# Patient Record
Sex: MALE | Race: WHITE | NOT HISPANIC OR LATINO | Employment: FULL TIME | URBAN - NONMETROPOLITAN AREA
[De-identification: names, ages, dates, MRNs, and addresses within clinical notes are randomized per-mention and may not be internally consistent; named-entity substitution may affect disease eponyms.]

---

## 2022-11-04 ENCOUNTER — OFFICE VISIT (OUTPATIENT)
Dept: FAMILY MEDICINE CLINIC | Facility: CLINIC | Age: 52
End: 2022-11-04

## 2022-11-04 VITALS
TEMPERATURE: 97.5 F | SYSTOLIC BLOOD PRESSURE: 145 MMHG | HEART RATE: 65 BPM | HEIGHT: 72 IN | DIASTOLIC BLOOD PRESSURE: 82 MMHG | RESPIRATION RATE: 18 BRPM | OXYGEN SATURATION: 97 % | WEIGHT: 291.3 LBS | BODY MASS INDEX: 39.45 KG/M2

## 2022-11-04 DIAGNOSIS — I10 PRIMARY HYPERTENSION: Primary | Chronic | ICD-10-CM

## 2022-11-04 DIAGNOSIS — E66.01 CLASS 2 SEVERE OBESITY WITH SERIOUS COMORBIDITY AND BODY MASS INDEX (BMI) OF 39.0 TO 39.9 IN ADULT, UNSPECIFIED OBESITY TYPE: Chronic | ICD-10-CM

## 2022-11-04 DIAGNOSIS — Z13.220 SCREENING, LIPID: ICD-10-CM

## 2022-11-04 PROCEDURE — 99204 OFFICE O/P NEW MOD 45 MIN: CPT

## 2022-11-04 RX ORDER — LOSARTAN POTASSIUM 100 MG/1
100 TABLET ORAL DAILY
Qty: 90 TABLET | Refills: 1 | Status: SHIPPED | OUTPATIENT
Start: 2022-11-04

## 2022-11-04 RX ORDER — LOSARTAN POTASSIUM 100 MG/1
TABLET ORAL
COMMUNITY
Start: 2022-10-06 | End: 2022-11-04 | Stop reason: SDUPTHER

## 2022-11-04 RX ORDER — AMLODIPINE BESYLATE 5 MG/1
TABLET ORAL
COMMUNITY
Start: 2022-10-05 | End: 2022-11-04 | Stop reason: SDUPTHER

## 2022-11-04 RX ORDER — AMLODIPINE BESYLATE 5 MG/1
5 TABLET ORAL DAILY
Qty: 90 TABLET | Refills: 1 | Status: SHIPPED | OUTPATIENT
Start: 2022-11-04

## 2022-11-04 NOTE — PROGRESS NOTES
"Chief Complaint  Annual Exam, Establish Care, and Labs Only    Subjective        Basim Austin presents to [facility]  History of Present Illness  He is here to be seen for establishing care. He is also wanting to have his annual wellness exam done.       Objective   Vital Signs:  /82   Pulse 65   Temp 97.5 °F (36.4 °C)   Resp 18   Ht 182.9 cm (72\")   Wt 132 kg (291 lb 4.8 oz)   SpO2 97%   BMI 39.51 kg/m²   Estimated body mass index is 39.51 kg/m² as calculated from the following:    Height as of this encounter: 182.9 cm (72\").    Weight as of this encounter: 132 kg (291 lb 4.8 oz).          Physical Exam  Constitutional:       Appearance: Normal appearance.   HENT:      Nose: Nose normal.      Mouth/Throat:      Mouth: Mucous membranes are moist.   Cardiovascular:      Rate and Rhythm: Normal rate and regular rhythm.      Pulses: Normal pulses.   Pulmonary:      Effort: Pulmonary effort is normal.      Breath sounds: Normal breath sounds.   Skin:     General: Skin is warm and dry.   Neurological:      General: No focal deficit present.      Mental Status: He is alert and oriented to person, place, and time.   Psychiatric:         Mood and Affect: Mood normal.         Behavior: Behavior normal.        Result Review :                Assessment and Plan   Diagnoses and all orders for this visit:    1. Primary hypertension (Primary)  -     amLODIPine (NORVASC) 5 MG tablet; Take 1 tablet by mouth Daily.  Dispense: 90 tablet; Refill: 1  -     losartan (COZAAR) 100 MG tablet; Take 1 tablet by mouth Daily.  Dispense: 90 tablet; Refill: 1  -     CBC w AUTO Differential; Future  -     Comprehensive metabolic panel; Future    2. Screening, lipid  -     Lipid panel; Future    3. Class 2 severe obesity with serious comorbidity and body mass index (BMI) of 39.0 to 39.9 in adult, unspecified obesity type (HCC)  Assessment & Plan:  Patient's (Body mass index is 39.51 kg/m².) " indicates that they are obese (BMI >30) with health conditions that include hypertension . Weight is unchanged. BMI is is above average; BMI management plan is completed. We discussed portion control and increasing exercise.              Follow Up   Return if symptoms worsen or fail to improve.  Patient was given instructions and counseling regarding his condition or for health maintenance advice. Please see specific information pulled into the AVS if appropriate.

## 2022-11-07 ENCOUNTER — LAB (OUTPATIENT)
Dept: LAB | Facility: HOSPITAL | Age: 52
End: 2022-11-07

## 2022-11-07 DIAGNOSIS — I10 PRIMARY HYPERTENSION: ICD-10-CM

## 2022-11-07 DIAGNOSIS — Z13.220 SCREENING, LIPID: ICD-10-CM

## 2022-11-07 LAB
ALBUMIN SERPL-MCNC: 4 G/DL (ref 3.5–5.2)
ALBUMIN/GLOB SERPL: 1.3 G/DL
ALP SERPL-CCNC: 80 U/L (ref 39–117)
ALT SERPL W P-5'-P-CCNC: 37 U/L (ref 1–41)
ANION GAP SERPL CALCULATED.3IONS-SCNC: 8.8 MMOL/L (ref 5–15)
AST SERPL-CCNC: 31 U/L (ref 1–40)
BASOPHILS # BLD AUTO: 0.05 10*3/MM3 (ref 0–0.2)
BASOPHILS NFR BLD AUTO: 0.9 % (ref 0–1.5)
BILIRUB SERPL-MCNC: 0.4 MG/DL (ref 0–1.2)
BUN SERPL-MCNC: 10 MG/DL (ref 6–20)
BUN/CREAT SERPL: 16.1 (ref 7–25)
CALCIUM SPEC-SCNC: 9.7 MG/DL (ref 8.6–10.5)
CHLORIDE SERPL-SCNC: 103 MMOL/L (ref 98–107)
CHOLEST SERPL-MCNC: 177 MG/DL (ref 0–200)
CO2 SERPL-SCNC: 26.2 MMOL/L (ref 22–29)
CREAT SERPL-MCNC: 0.62 MG/DL (ref 0.76–1.27)
DEPRECATED RDW RBC AUTO: 39 FL (ref 37–54)
EGFRCR SERPLBLD CKD-EPI 2021: 113.6 ML/MIN/1.73
EOSINOPHIL # BLD AUTO: 0.2 10*3/MM3 (ref 0–0.4)
EOSINOPHIL NFR BLD AUTO: 3.4 % (ref 0.3–6.2)
ERYTHROCYTE [DISTWIDTH] IN BLOOD BY AUTOMATED COUNT: 12.6 % (ref 12.3–15.4)
GLOBULIN UR ELPH-MCNC: 3.1 GM/DL
GLUCOSE SERPL-MCNC: 89 MG/DL (ref 65–99)
HCT VFR BLD AUTO: 45 % (ref 37.5–51)
HDLC SERPL-MCNC: 35 MG/DL (ref 40–60)
HGB BLD-MCNC: 15.3 G/DL (ref 13–17.7)
IMM GRANULOCYTES # BLD AUTO: 0.02 10*3/MM3 (ref 0–0.05)
IMM GRANULOCYTES NFR BLD AUTO: 0.3 % (ref 0–0.5)
LDLC SERPL CALC-MCNC: 118 MG/DL (ref 0–100)
LDLC/HDLC SERPL: 3.29 {RATIO}
LYMPHOCYTES # BLD AUTO: 1.88 10*3/MM3 (ref 0.7–3.1)
LYMPHOCYTES NFR BLD AUTO: 32.1 % (ref 19.6–45.3)
MCH RBC QN AUTO: 29.1 PG (ref 26.6–33)
MCHC RBC AUTO-ENTMCNC: 34 G/DL (ref 31.5–35.7)
MCV RBC AUTO: 85.7 FL (ref 79–97)
MONOCYTES # BLD AUTO: 0.53 10*3/MM3 (ref 0.1–0.9)
MONOCYTES NFR BLD AUTO: 9.1 % (ref 5–12)
NEUTROPHILS NFR BLD AUTO: 3.17 10*3/MM3 (ref 1.7–7)
NEUTROPHILS NFR BLD AUTO: 54.2 % (ref 42.7–76)
NRBC BLD AUTO-RTO: 0 /100 WBC (ref 0–0.2)
PLATELET # BLD AUTO: 225 10*3/MM3 (ref 140–450)
PMV BLD AUTO: 10.6 FL (ref 6–12)
POTASSIUM SERPL-SCNC: 4.4 MMOL/L (ref 3.5–5.2)
PROT SERPL-MCNC: 7.1 G/DL (ref 6–8.5)
RBC # BLD AUTO: 5.25 10*6/MM3 (ref 4.14–5.8)
SODIUM SERPL-SCNC: 138 MMOL/L (ref 136–145)
TRIGL SERPL-MCNC: 134 MG/DL (ref 0–150)
VLDLC SERPL-MCNC: 24 MG/DL (ref 5–40)
WBC NRBC COR # BLD: 5.85 10*3/MM3 (ref 3.4–10.8)

## 2022-11-07 PROCEDURE — 80053 COMPREHEN METABOLIC PANEL: CPT

## 2022-11-07 PROCEDURE — 36415 COLL VENOUS BLD VENIPUNCTURE: CPT

## 2022-11-07 PROCEDURE — 85025 COMPLETE CBC W/AUTO DIFF WBC: CPT

## 2022-11-07 PROCEDURE — 80061 LIPID PANEL: CPT

## 2022-11-09 PROBLEM — E66.01 CLASS 2 SEVERE OBESITY WITH SERIOUS COMORBIDITY AND BODY MASS INDEX (BMI) OF 39.0 TO 39.9 IN ADULT: Status: ACTIVE | Noted: 2022-11-09

## 2022-11-09 PROBLEM — E66.812 CLASS 2 SEVERE OBESITY WITH SERIOUS COMORBIDITY AND BODY MASS INDEX (BMI) OF 39.0 TO 39.9 IN ADULT: Status: ACTIVE | Noted: 2022-11-09

## 2022-11-09 PROBLEM — I10 PRIMARY HYPERTENSION: Status: ACTIVE | Noted: 2022-11-09

## 2022-11-09 NOTE — ASSESSMENT & PLAN NOTE
Patient's (Body mass index is 39.51 kg/m².) indicates that they are obese (BMI >30) with health conditions that include hypertension . Weight is unchanged. BMI is is above average; BMI management plan is completed. We discussed portion control and increasing exercise.

## 2023-04-17 ENCOUNTER — OFFICE VISIT (OUTPATIENT)
Dept: FAMILY MEDICINE CLINIC | Facility: CLINIC | Age: 53
End: 2023-04-17
Payer: COMMERCIAL

## 2023-04-17 ENCOUNTER — HOSPITAL ENCOUNTER (OUTPATIENT)
Dept: GENERAL RADIOLOGY | Facility: HOSPITAL | Age: 53
Discharge: HOME | End: 2023-04-17
Payer: COMMERCIAL

## 2023-04-17 ENCOUNTER — LAB (OUTPATIENT)
Dept: LAB | Facility: HOSPITAL | Age: 53
End: 2023-04-17
Payer: COMMERCIAL

## 2023-04-17 VITALS
SYSTOLIC BLOOD PRESSURE: 142 MMHG | BODY MASS INDEX: 40.17 KG/M2 | HEIGHT: 72 IN | OXYGEN SATURATION: 95 % | WEIGHT: 296.6 LBS | HEART RATE: 72 BPM | RESPIRATION RATE: 20 BRPM | DIASTOLIC BLOOD PRESSURE: 87 MMHG | TEMPERATURE: 98 F

## 2023-04-17 DIAGNOSIS — M25.562 ACUTE PAIN OF LEFT KNEE: ICD-10-CM

## 2023-04-17 DIAGNOSIS — M25.562 ACUTE PAIN OF LEFT KNEE: Primary | ICD-10-CM

## 2023-04-17 PROCEDURE — 36415 COLL VENOUS BLD VENIPUNCTURE: CPT

## 2023-04-17 PROCEDURE — 84550 ASSAY OF BLOOD/URIC ACID: CPT

## 2023-04-17 PROCEDURE — 73562 X-RAY EXAM OF KNEE 3: CPT

## 2023-04-17 NOTE — PROGRESS NOTES
Chief Complaint   Patient presents with   • Knee Pain     Left knee pain x2 weeks, no injury.       Subjective          Basim Austin presents to [facility]    History of Present Illness  He is here to be seen for knee pain in the left knee for 2 weeks. He has no injury that he knows of but it has been worsening. He has a past diagnosis of gout but wasn't sure that was what this was because it usually gets better and usually is in his toe or hands. Today we will take a uric acid level on him and an xray to determine next steps on treatment. He has hydrocodone at home he can take in the mean time.       Past History:  Medical History: has a past medical history of Arthritis () and Hypertension ().   Surgical History: has a past surgical history that includes Hernia repair () and Colonoscopy (,,).   Family History: family history includes Cancer in his mother; Heart disease in his father; Hyperlipidemia in his father.   Social History: reports that he quit smoking about 5 months ago. His smoking use included cigarettes. He started smoking about 11 years ago. He has a 2.50 pack-year smoking history. He has never been exposed to tobacco smoke. He has never used smokeless tobacco. He reports that he does not drink alcohol and does not use drugs.  Allergies: Patient has no known allergies.  (Not in a hospital admission)       Social History     Socioeconomic History   • Marital status:    Tobacco Use   • Smoking status: Former     Packs/day: 0.50     Years: 5.00     Pack years: 2.50     Types: Cigarettes     Start date: 2012     Quit date: 2022     Years since quittin.4     Passive exposure: Never   • Smokeless tobacco: Never   • Tobacco comments:     Stopped Smoking 2022   Vaping Use   • Vaping Use: Never used   Substance and Sexual Activity   • Alcohol use: Never   • Drug use: Never   • Sexual activity: Yes     Partners: Female  "      There are no preventive care reminders to display for this patient.    Objective     Vital Signs:   /87 (BP Location: Left arm, Patient Position: Sitting, Cuff Size: Adult)   Pulse 72   Temp 98 °F (36.7 °C) (Temporal)   Resp 20   Ht 182.9 cm (72\")   Wt 135 kg (296 lb 9.6 oz)   SpO2 95%   BMI 40.23 kg/m²       Physical Exam  Constitutional:       Appearance: Normal appearance.   HENT:      Nose: Nose normal.      Mouth/Throat:      Mouth: Mucous membranes are moist.   Cardiovascular:      Rate and Rhythm: Normal rate and regular rhythm.      Pulses: Normal pulses.      Heart sounds: Normal heart sounds.   Pulmonary:      Effort: Pulmonary effort is normal.      Breath sounds: Normal breath sounds.   Musculoskeletal:         General: Swelling and tenderness present.   Skin:     General: Skin is warm and dry.   Neurological:      General: No focal deficit present.      Mental Status: He is alert and oriented to person, place, and time.   Psychiatric:         Mood and Affect: Mood normal.         Behavior: Behavior normal.          Review of Systems   All other systems reviewed and are negative.       Result Review :                 Assessment and Plan    Diagnoses and all orders for this visit:    1. Acute pain of left knee (Primary)  -     Uric acid; Future  -     XR Knee 3 View Left; Future      I spent 22 minutes caring for Basim on this date of service. This time includes time spent by me in the following activities:preparing for the visit, reviewing tests, obtaining and/or reviewing a separately obtained history, performing a medically appropriate examination and/or evaluation , counseling and educating the patient/family/caregiver, ordering medications, tests, or procedures and documenting information in the medical record    Pt thought to be clinically stable at this time.    Follow Up   No follow-ups on file.  Patient was given instructions and counseling regarding his condition or for health " maintenance advice. Please see specific information pulled into the AVS if appropriate.

## 2023-04-18 ENCOUNTER — TELEPHONE (OUTPATIENT)
Dept: FAMILY MEDICINE CLINIC | Facility: CLINIC | Age: 53
End: 2023-04-18

## 2023-04-18 LAB — URATE SERPL-MCNC: 5 MG/DL (ref 3.4–7)

## 2023-04-18 NOTE — TELEPHONE ENCOUNTER
Patient requesting rx for prednisone, states this works well for him.  His uric acid level was normal.  Left message for patient that request would be addressed with provider.

## 2023-04-18 NOTE — TELEPHONE ENCOUNTER
Caller: Basim Austin    Relationship: Self    Best call back number: [phone]    What is the best time to reach you: ANYTIME    Who are you requesting to speak with (clinical staff, provider,  specific staff member): CLINICAL      What was the call regarding: THE PATIENT STATED HE WOULD LIKE A CALL BACK TO DISCUSS RESULTS AND IF PCP WILL PRESCRIBE PREDNISONE FOR HIM. HE STATED THIS USUALLY WORKS WELL  [id number] - [place], [place] - [address] [facility]. - [phone]  - [phone]  [phone]    Do you require a callback: YES

## 2023-04-19 DIAGNOSIS — M13.169 INFLAMMATION OF JOINT OF KNEE: Primary | ICD-10-CM

## 2023-04-19 RX ORDER — PREDNISONE 20 MG/1
TABLET ORAL
Qty: 5 TABLET | Refills: 0 | Status: SHIPPED | OUTPATIENT
Start: 2023-04-19 | End: 2023-04-25

## 2023-04-28 ENCOUNTER — TELEPHONE (OUTPATIENT)
Dept: FAMILY MEDICINE CLINIC | Facility: CLINIC | Age: 53
End: 2023-04-28
Payer: COMMERCIAL

## 2023-04-28 NOTE — TELEPHONE ENCOUNTER
Caller: DILNA GIL    Relationship: Emergency Contact    Best call back number: [phone]    What form or medical record are you requesting: XRAY RESULTS    Who is requesting this form or medical record from you: DR. ESPINOZA - RHEUMATOLOGIST     Timeframe paperwork needed: BEFORE Monday 5/1/23    PHONE NUMBER TO DR ESPINOZA: [phone]

## 2023-05-01 DIAGNOSIS — I10 PRIMARY HYPERTENSION: Chronic | ICD-10-CM

## 2023-05-03 RX ORDER — LOSARTAN POTASSIUM 100 MG/1
100 TABLET ORAL DAILY
Qty: 90 TABLET | Refills: 1 | Status: SHIPPED | OUTPATIENT
Start: 2023-05-03

## 2023-05-04 ENCOUNTER — OFFICE VISIT (OUTPATIENT)
Dept: ORTHOPEDIC SURGERY | Facility: CLINIC | Age: 53
End: 2023-05-04
Payer: COMMERCIAL

## 2023-05-04 VITALS — HEART RATE: 67 BPM | WEIGHT: 296 LBS | HEIGHT: 72 IN | OXYGEN SATURATION: 97 % | BODY MASS INDEX: 40.09 KG/M2

## 2023-05-04 DIAGNOSIS — M17.12 PRIMARY OSTEOARTHRITIS OF LEFT KNEE: Primary | ICD-10-CM

## 2023-05-04 RX ORDER — SULINDAC 200 MG/1
1 TABLET ORAL 2 TIMES DAILY PRN
COMMUNITY

## 2023-05-04 RX ORDER — ALLOPURINOL 300 MG/1
1 TABLET ORAL DAILY
COMMUNITY

## 2023-05-04 RX ORDER — PREDNISONE 10 MG/1
TABLET ORAL
COMMUNITY

## 2023-05-04 NOTE — PROGRESS NOTES
"Chief Complaint  Pain and Initial Evaluation of the Left Knee     Subjective      Basim Austin presents to [facility] for evaluation of the left knee. The patient reports left knee pain that started a couple weeks ago. He denies injury or trauma. He reports a history of gout but never in her knees. He seen his RA Doctor who gave him an injection that gave him relief. He has no other complaints.     No Known Allergies     Social History     Socioeconomic History   • Marital status:    Tobacco Use   • Smoking status: Former     Types: Cigars     Passive exposure: Never   • Smokeless tobacco: Never   • Tobacco comments:     Stopped Smoking 11/01/2022   Vaping Use   • Vaping Use: Never used   Substance and Sexual Activity   • Alcohol use: Never   • Drug use: Never   • Sexual activity: Yes     Partners: Female        Review of Systems     Objective   Vital Signs:   Pulse 67   Ht 182.9 cm (72\")   Wt 134 kg (296 lb)   SpO2 97%   BMI 40.14 kg/m²       Physical Exam  Constitutional:       Appearance: Normal appearance. The patient is well-developed and normal weight.   HENT:      Head: Normocephalic.      Right Ear: Hearing and external ear normal.      Left Ear: Hearing and external ear normal.      Nose: Nose normal.   Eyes:      Conjunctiva/sclera: Conjunctivae normal.   Cardiovascular:      Rate and Rhythm: Normal rate.   Pulmonary:      Effort: Pulmonary effort is normal.      Breath sounds: No wheezing or rales.   Abdominal:      Palpations: Abdomen is soft.      Tenderness: There is no abdominal tenderness.   Musculoskeletal:      Cervical back: Normal range of motion.   Skin:     Findings: No rash.   Neurological:      Mental Status: The patient is alert and oriented to person, place, and time.   Psychiatric:         Mood and Affect: Mood and affect normal.         Judgment: Judgment normal.       Ortho Exam      Left knee- Stable to varus/valgus stress. Stable to " anterior/posterior drawer. Positive EHL, FHL, GS and TA. Sensation intact to all 5 nerves of the foot. Positive pulses. Negative Lachman's. Negative Sheron's. Tender to the medial joint line. ROM -5 to 125 degrees. Knee Extensor Mechanism  Intact.     Procedures      Imaging Results (Most Recent)     None           Result Review :       XR Knee 3 View Left    Result Date: 4/17/2023  Narrative: PROCEDURE: XR KNEE 3 VW LEFT  COMPARISON: None  INDICATIONS: LEFT KNEE PAIN, MOSTLY LATERAL TO PATELLA, FOR TWO WEEKS. NO KNOWN INJURY.  FINDINGS:  There is marginal osteophyte formation along the intercondylar notch and medial and lateral joint lines as well as moderate patellofemoral joint disease.  No fractures or subluxation is identified.  There is joint space narrowing primarily in the medial compartment.      Impression:  Tricompartmental osteoarthritis.  No acute findings      Ousmane Pruitt MD       Electronically Signed and Approved By: Ousmane Pruitt MD on 4/17/2023 at 16:52                      Assessment and Plan     Diagnoses and all orders for this visit:    1. Primary osteoarthritis of left knee (Primary)        Discussed the treatment plan with the patient.  I reviewed his previous x-rays. Plan to continue conservative treatment. Home exercises given today. Plan for OTC medication as needed. Plan for repeat injections as needed    Call or return if worsening symptoms.    Follow Up     PRN      Patient was given instructions and counseling regarding his condition or for health maintenance advice. Please see specific information pulled into the AVS if appropriate.     Scribed for Ousmane Edwards MD by Mirna Broussard.  05/04/23   15:16 EDT    I have personally performed the services described in this document as scribed by the above individual and it is both accurate and complete. Ousmane Edwards MD 05/06/23

## 2023-08-17 ENCOUNTER — OFFICE VISIT (OUTPATIENT)
Dept: ORTHOPEDIC SURGERY | Facility: CLINIC | Age: 53
End: 2023-08-17
Payer: COMMERCIAL

## 2023-08-17 VITALS — BODY MASS INDEX: 39.96 KG/M2 | HEIGHT: 72 IN | WEIGHT: 295 LBS

## 2023-08-17 DIAGNOSIS — M17.12 PRIMARY OSTEOARTHRITIS OF LEFT KNEE: Primary | ICD-10-CM

## 2023-08-17 NOTE — PROGRESS NOTES
Chief Complaint  Pain of the Left Knee     Subjective      Basim Austin presents to [facility] for evaluation of the left knee. The patient has been treating his left knee osteoarthritis conservatively. He is here today for a PRP injection.     No Known Allergies     Social History     Socioeconomic History    Marital status:    Tobacco Use    Smoking status: Former     Types: Cigars     Passive exposure: Never    Smokeless tobacco: Never    Tobacco comments:     Stopped Smoking 11/01/2022   Vaping Use    Vaping Use: Never used   Substance and Sexual Activity    Alcohol use: Never    Drug use: Never    Sexual activity: Yes     Partners: Female        I reviewed the patient's chief complaint, history of present illness, review of systems, past medical history, surgical history, family history, social history, medications, and allergy list.     Review of Systems     Constitutional: Denies fevers, chills, weight loss  Cardiovascular: Denies chest pain, shortness of breath  Skin: Denies rashes, acute skin changes  Neurologic: Denies headache, loss of consciousness  MSK: Left knee pain      Vital Signs:   There were no vitals taken for this visit.         Physical Exam  General: Alert. No acute distress    Ortho Exam      Left knee- Stable to varus/valgus stress. Stable to anterior/posterior drawer. Positive crepitus. Positive EHL, FHL, GS and TA. Sensation intact to all 5 nerves of the foot. Positive pulses. Neurovascularly intact. ROM .      L knee PRP injection  Date/Time: 8/17/2023 8:28 AM  Consent given by: patient  Site marked: site marked  Timeout: Immediately prior to procedure a time out was called to verify the correct patient, procedure, equipment, support staff and site/side marked as required   Supporting Documentation  Indications: pain   Procedure Details  Location: knee - L knee  Needle gauge: 21G.  Patient tolerance: patient tolerated the procedure well with no  immediate complications        Imaging Results (Most Recent)       None             Result Review :       No results found.           Assessment and Plan     Diagnoses and all orders for this visit:    1. Primary osteoarthritis of left knee (Primary)        Discussed the treatment plan with the patient.  Discussed the risks and benefits of a left knee PRP injection. The patient expressed understanding and wished to proceed. He tolerated the injection well. Plan to continue medication and home exercises. Normal knee brace given today.     Call or return if worsening symptoms.    Follow Up     3 months      Patient was given instructions and counseling regarding his condition or for health maintenance advice. Please see specific information pulled into the AVS if appropriate.     Scribed for Ousmane Edwards MD by Mirna Broussard.  08/17/23   08:03 EDT  I have personally performed the services described in this document as scribed by the above individual and it is both accurate and complete. Ousmane Edwards MD 08/19/23

## 2023-10-30 DIAGNOSIS — I10 PRIMARY HYPERTENSION: Chronic | ICD-10-CM

## 2023-10-30 RX ORDER — LOSARTAN POTASSIUM 100 MG/1
100 TABLET ORAL DAILY
Qty: 90 TABLET | Refills: 1 | Status: SHIPPED | OUTPATIENT
Start: 2023-10-30

## 2023-11-06 ENCOUNTER — OFFICE VISIT (OUTPATIENT)
Dept: FAMILY MEDICINE CLINIC | Facility: CLINIC | Age: 53
End: 2023-11-06
Payer: COMMERCIAL

## 2023-11-06 VITALS
BODY MASS INDEX: 39.28 KG/M2 | TEMPERATURE: 98.6 F | HEIGHT: 72 IN | HEART RATE: 79 BPM | DIASTOLIC BLOOD PRESSURE: 92 MMHG | SYSTOLIC BLOOD PRESSURE: 171 MMHG | WEIGHT: 290 LBS | OXYGEN SATURATION: 96 %

## 2023-11-06 DIAGNOSIS — Z13.29 THYROID DISORDER SCREENING: ICD-10-CM

## 2023-11-06 DIAGNOSIS — Z11.59 ENCOUNTER FOR HEPATITIS C SCREENING TEST FOR LOW RISK PATIENT: ICD-10-CM

## 2023-11-06 DIAGNOSIS — E66.01 CLASS 2 SEVERE OBESITY WITH SERIOUS COMORBIDITY AND BODY MASS INDEX (BMI) OF 39.0 TO 39.9 IN ADULT, UNSPECIFIED OBESITY TYPE: ICD-10-CM

## 2023-11-06 DIAGNOSIS — Z00.00 ANNUAL PHYSICAL EXAM: Primary | ICD-10-CM

## 2023-11-06 DIAGNOSIS — Z12.5 PROSTATE CANCER SCREENING: ICD-10-CM

## 2023-11-06 DIAGNOSIS — Z13.220 SCREENING FOR LIPID DISORDERS: ICD-10-CM

## 2023-11-06 DIAGNOSIS — I10 PRIMARY HYPERTENSION: ICD-10-CM

## 2023-11-06 RX ORDER — AMLODIPINE BESYLATE 10 MG/1
10 TABLET ORAL DAILY
Qty: 90 TABLET | Refills: 1 | Status: SHIPPED | OUTPATIENT
Start: 2023-11-06

## 2023-11-06 RX ORDER — ALLOPURINOL 100 MG/1
TABLET ORAL
COMMUNITY
Start: 2023-09-26

## 2023-11-06 NOTE — PROGRESS NOTES
"Chief Complaint  Annual Exam (Here for his biometric screening for work. )    Subjective        Basim Austin presents to [facility]  History of Present Illness  Basim presents to the clinic for an annual exam. His bp is still running high, states it runs high all the time. He does have a bp monitor at home instructed to continue to monitor his bp at home and keep a log. His arthritis in his left knee is still bothering him, the takes tylenol arthritis but it is not helping, he has an appointment in the morning with his ortho doctor for the knee. No other acute complaints at this time.       Objective   Vital Signs:  /92 (BP Location: Left arm, Patient Position: Sitting)   Pulse 79   Temp 98.6 °F (37 °C) (Infrared)   Ht 182.9 cm (72\")   Wt 132 kg (290 lb)   SpO2 96%   BMI 39.33 kg/m²   Estimated body mass index is 39.33 kg/m² as calculated from the following:    Height as of this encounter: 182.9 cm (72\").    Weight as of this encounter: 132 kg (290 lb).       Class 2 Severe Obesity (BMI >=35 and <=39.9). Obesity-related health conditions include the following: hypertension. Obesity is unchanged. BMI is is above average; BMI management plan is completed. We discussed portion control and increasing exercise.      Physical Exam  Constitutional:       Appearance: Normal appearance.   HENT:      Nose: Nose normal.      Mouth/Throat:      Mouth: Mucous membranes are moist.   Cardiovascular:      Rate and Rhythm: Normal rate and regular rhythm.      Pulses: Normal pulses.      Heart sounds: Normal heart sounds.   Pulmonary:      Effort: Pulmonary effort is normal.      Breath sounds: Normal breath sounds.   Skin:     General: Skin is warm and dry.   Neurological:      General: No focal deficit present.      Mental Status: He is alert and oriented to person, place, and time.   Psychiatric:         Mood and Affect: Mood normal.         Behavior: Behavior normal.      "   Result Review :                   Assessment and Plan   Diagnoses and all orders for this visit:    1. Annual physical exam (Primary)  -     CBC & Differential; Future  -     Comprehensive metabolic panel; Future    2. Primary hypertension  -     amLODIPine (NORVASC) 10 MG tablet; Take 1 tablet by mouth Daily.  Dispense: 90 tablet; Refill: 1    3. Thyroid disorder screening  -     TSH+Free T4; Future    4. Screening for lipid disorders  -     Lipid panel; Future    5. Encounter for hepatitis C screening test for low risk patient  -     Hepatitis C antibody; Future    6. Prostate cancer screening  -     PSA SCREENING; Future    7. Class 2 severe obesity with serious comorbidity and body mass index (BMI) of 39.0 to 39.9 in adult, unspecified obesity type  Assessment & Plan:  Patient's (Body mass index is 39.33 kg/m².) indicates that they are obese (BMI >30) with health conditions that include hypertension . Weight is unchanged. BMI  is above average; BMI management plan is completed. We discussed portion control and increasing exercise.       Discussed age appropriate preventative measures. Written information given. Patient verbalized understanding.  Preventative measures discussed: vaccines, routine preventative exams, yearly labwork.          Follow Up   Return in about 1 month (around 12/6/2023), or if symptoms worsen or fail to improve.  Patient was given instructions and counseling regarding his condition or for health maintenance advice. Please see specific information pulled into the AVS if appropriate.

## 2023-11-07 ENCOUNTER — PREP FOR SURGERY (OUTPATIENT)
Dept: OTHER | Facility: HOSPITAL | Age: 53
End: 2023-11-07
Payer: COMMERCIAL

## 2023-11-07 ENCOUNTER — OFFICE VISIT (OUTPATIENT)
Dept: ORTHOPEDIC SURGERY | Facility: CLINIC | Age: 53
End: 2023-11-07
Payer: COMMERCIAL

## 2023-11-07 VITALS
WEIGHT: 291.01 LBS | OXYGEN SATURATION: 94 % | HEART RATE: 71 BPM | HEIGHT: 72 IN | DIASTOLIC BLOOD PRESSURE: 93 MMHG | BODY MASS INDEX: 39.42 KG/M2 | SYSTOLIC BLOOD PRESSURE: 161 MMHG

## 2023-11-07 DIAGNOSIS — M17.12 PRIMARY OSTEOARTHRITIS OF LEFT KNEE: Primary | ICD-10-CM

## 2023-11-07 RX ORDER — CEFAZOLIN SODIUM 2 G/100ML
2 INJECTION, SOLUTION INTRAVENOUS ONCE
OUTPATIENT
Start: 2023-11-07 | End: 2023-11-07

## 2023-11-07 RX ORDER — TRANEXAMIC ACID 10 MG/ML
1000 INJECTION, SOLUTION INTRAVENOUS ONCE
OUTPATIENT
Start: 2023-11-07 | End: 2023-11-07

## 2023-11-07 RX ORDER — CEFAZOLIN SODIUM IN 0.9 % NACL 3 G/100 ML
3 INTRAVENOUS SOLUTION, PIGGYBACK (ML) INTRAVENOUS ONCE
OUTPATIENT
Start: 2023-11-07 | End: 2023-11-07

## 2023-11-07 NOTE — PROGRESS NOTES
"Chief Complaint  Pain and Follow-up of the Left Knee    Subjective          History of Present Illness      Basim Austin is a 53 y.o. male  presents to [facility] for     Patient presents for follow-up evaluation of left knee arthritis, left knee pain.  He states that he is here earlier than expected due to how severe his pain is.  He states he \"cannot take the pain \"of his left knee.  He points anterior medial lateral knee as areas of pain he has tried PRP and steroid injections, bracing and anti-inflammation medication with no relief.  He would like to schedule knee replacement.      No Known Allergies     Social History     Socioeconomic History    Marital status:    Tobacco Use    Smoking status: Former     Types: Cigars     Quit date:      Years since quittin.8     Passive exposure: Never    Smokeless tobacco: Never    Tobacco comments:     Stopped Smoking 2022   Vaping Use    Vaping Use: Never used   Substance and Sexual Activity    Alcohol use: Never    Drug use: Never    Sexual activity: Yes     Partners: Female        REVIEW OF SYSTEMS    Constitutional: Awake alert and oriented x3, no acute distress, denies fevers, chills, weight loss  Respiratory: No respiratory distress  Vascular: Brisk cap refill, Intact distal pulses, No cyanosis, compartments soft with no signs or symptoms of compartment syndrome or DVT.   Cardiovascular: Denies chest pain, shortness of breath  Skin: Denies rashes, acute skin changes  Neurologic: Denies headache, loss of consciousness  MSK: Left knee pain      Objective   Vital Signs:   /93   Pulse 71   Ht 182.9 cm (72\")   Wt 132 kg (291 lb 0.1 oz)   SpO2 94%   BMI 39.47 kg/m²     Body mass index is 39.47 kg/m².    Physical Exam       Left knee- Stable to varus/valgus stress. Stable to anterior/posterior drawer. Positive EHL, FHL, GS and TA. Sensation intact to all 5 nerves of the foot. Positive pulses. Negative " Lachman's. Negative Sheron's. Tender to the medial joint line. ROM -5 to 125 degrees. Knee Extensor Mechanism  Intact.          Procedures    Imaging Results (Most Recent)       None             Result Review :   The following data was reviewed by: ADIN Hall on 11/07/2023:               Assessment and Plan    Diagnoses and all orders for this visit:    1. Primary osteoarthritis of left knee (Primary)        Discussed diagnosis and treatment options with patient due to failing conservative treatments with injections and therapy and bracing and NSAIDs patient is requesting surgical intervention.  Dr. Edwards was consulted remotely for this and risk benefits procedure recovery were discussed with the patient regarding left total knee arthroplasty, with robotic assistance, patient agreed to have surgery scheduled follow-up 2 weeks postop    Discussed surgery., Risks/benefits discussed with patient including, but not limited to: infection, bleeding, neurovascular damage, re-rupture, aesthetic deformity, need for further surgery, and death., Discussed with patient the implant type being used during surgery and patient understands., Surgery pamphlet given., Call or return if worsening symptoms., DME order for a 3 in 1 given today due to patient will be confined to one room/level of the home that does not offer a toilet during postop recovery. , I am requesting authorization for the shoshana® System to reduce the patient's pain and aid in their recovery. I believe the shoshana® Sport System will have positive impact on my patient's quality of life. I would appreciate prompt review of the information and authorization of the shoshana® System.  shoshana® Sport utilizes ultrasonic waves that penetrate 5 cm into the tissue, which increases circulation, oxygen and nutrient delivery, and the removal of waste products, such as lactic acid, from the site of the musculoskeletal injury.  The shoshana® System is a new FDA approved  (FDA 510K 925249) treatment modality that that has been shown in recent clinical trials sponsored by the NIH (National Institutes of Health), the DOD (Department of Defense) and [employer] the research arm of the NASA (National [employer]eron[employer]ics and Space Administration) to reduce patient's pain, increase mobility and speed recovery. , and I am ordering the use of the Nice1 cold therapy machine for 60 days post-op as part of an opioid-sparing approach to help manage pain and edema.  I feel this is medically necessary for the best care for this patient.       Follow Up   Return for 2 WEEKS POST OP.  Patient was given instructions and counseling regarding his condition or for health maintenance advice. Please see specific information pulled into the AVS if appropriate.       EMR Dragon/Transcription disclaimer:  Much of this encounter note is an electronic transcription/translation of spoken language to printed text, aka voice recognition.  The electronic translation of spoken language may permit erroneous or at times nonsensical words or phrases to be inadvertently transcribed; although I have reviewed the note for such errors, some may still exist so please interpret based on surrounding text content.

## 2023-11-08 NOTE — ASSESSMENT & PLAN NOTE
Patient's (Body mass index is 39.33 kg/m².) indicates that they are obese (BMI >30) with health conditions that include hypertension . Weight is unchanged. BMI  is above average; BMI management plan is completed. We discussed portion control and increasing exercise.

## 2023-11-09 ENCOUNTER — LAB (OUTPATIENT)
Dept: LAB | Facility: HOSPITAL | Age: 53
End: 2023-11-09
Payer: COMMERCIAL

## 2023-11-09 DIAGNOSIS — Z11.59 ENCOUNTER FOR HEPATITIS C SCREENING TEST FOR LOW RISK PATIENT: ICD-10-CM

## 2023-11-09 DIAGNOSIS — Z00.00 ANNUAL PHYSICAL EXAM: ICD-10-CM

## 2023-11-09 DIAGNOSIS — M17.12 PRIMARY OSTEOARTHRITIS OF LEFT KNEE: ICD-10-CM

## 2023-11-09 DIAGNOSIS — Z12.5 PROSTATE CANCER SCREENING: ICD-10-CM

## 2023-11-09 DIAGNOSIS — Z13.220 SCREENING FOR LIPID DISORDERS: ICD-10-CM

## 2023-11-09 DIAGNOSIS — Z13.29 THYROID DISORDER SCREENING: ICD-10-CM

## 2023-11-09 LAB
ALBUMIN SERPL-MCNC: 4.6 G/DL (ref 3.5–5.2)
ALBUMIN/GLOB SERPL: 2 G/DL
ALP SERPL-CCNC: 76 U/L (ref 39–117)
ALT SERPL W P-5'-P-CCNC: 32 U/L (ref 1–41)
ANION GAP SERPL CALCULATED.3IONS-SCNC: 10 MMOL/L (ref 5–15)
AST SERPL-CCNC: 23 U/L (ref 1–40)
BASOPHILS # BLD AUTO: 0.05 10*3/MM3 (ref 0–0.2)
BASOPHILS NFR BLD AUTO: 0.9 % (ref 0–1.5)
BILIRUB SERPL-MCNC: 0.4 MG/DL (ref 0–1.2)
BUN SERPL-MCNC: 12 MG/DL (ref 6–20)
BUN/CREAT SERPL: 18.2 (ref 7–25)
CALCIUM SPEC-SCNC: 10 MG/DL (ref 8.6–10.5)
CHLORIDE SERPL-SCNC: 105 MMOL/L (ref 98–107)
CHOLEST SERPL-MCNC: 181 MG/DL (ref 0–200)
CO2 SERPL-SCNC: 25 MMOL/L (ref 22–29)
CREAT SERPL-MCNC: 0.66 MG/DL (ref 0.76–1.27)
DEPRECATED RDW RBC AUTO: 40.6 FL (ref 37–54)
EGFRCR SERPLBLD CKD-EPI 2021: 110.8 ML/MIN/1.73
EOSINOPHIL # BLD AUTO: 0.14 10*3/MM3 (ref 0–0.4)
EOSINOPHIL NFR BLD AUTO: 2.5 % (ref 0.3–6.2)
ERYTHROCYTE [DISTWIDTH] IN BLOOD BY AUTOMATED COUNT: 13.1 % (ref 12.3–15.4)
GLOBULIN UR ELPH-MCNC: 2.3 GM/DL
GLUCOSE SERPL-MCNC: 97 MG/DL (ref 65–99)
HBA1C MFR BLD: 5.2 % (ref 4.8–5.6)
HCT VFR BLD AUTO: 41.8 % (ref 37.5–51)
HCV AB SER DONR QL: NORMAL
HDLC SERPL-MCNC: 36 MG/DL (ref 40–60)
HGB BLD-MCNC: 14.2 G/DL (ref 13–17.7)
IMM GRANULOCYTES # BLD AUTO: 0.02 10*3/MM3 (ref 0–0.05)
IMM GRANULOCYTES NFR BLD AUTO: 0.4 % (ref 0–0.5)
INR PPP: 0.99 (ref 0.86–1.15)
LDLC SERPL CALC-MCNC: 119 MG/DL (ref 0–100)
LDLC/HDLC SERPL: 3.21 {RATIO}
LYMPHOCYTES # BLD AUTO: 1.9 10*3/MM3 (ref 0.7–3.1)
LYMPHOCYTES NFR BLD AUTO: 34.5 % (ref 19.6–45.3)
MCH RBC QN AUTO: 29 PG (ref 26.6–33)
MCHC RBC AUTO-ENTMCNC: 34 G/DL (ref 31.5–35.7)
MCV RBC AUTO: 85.5 FL (ref 79–97)
MONOCYTES # BLD AUTO: 0.47 10*3/MM3 (ref 0.1–0.9)
MONOCYTES NFR BLD AUTO: 8.5 % (ref 5–12)
NEUTROPHILS NFR BLD AUTO: 2.93 10*3/MM3 (ref 1.7–7)
NEUTROPHILS NFR BLD AUTO: 53.2 % (ref 42.7–76)
NRBC BLD AUTO-RTO: 0 /100 WBC (ref 0–0.2)
PLATELET # BLD AUTO: 258 10*3/MM3 (ref 140–450)
PMV BLD AUTO: 10.9 FL (ref 6–12)
POTASSIUM SERPL-SCNC: 4.1 MMOL/L (ref 3.5–5.2)
PROT SERPL-MCNC: 6.9 G/DL (ref 6–8.5)
PROTHROMBIN TIME: 13.3 SECONDS (ref 11.8–14.9)
PSA SERPL-MCNC: 0.59 NG/ML (ref 0–4)
RBC # BLD AUTO: 4.89 10*6/MM3 (ref 4.14–5.8)
SODIUM SERPL-SCNC: 140 MMOL/L (ref 136–145)
T4 FREE SERPL-MCNC: 1.14 NG/DL (ref 0.93–1.7)
TRIGL SERPL-MCNC: 147 MG/DL (ref 0–150)
TSH SERPL DL<=0.05 MIU/L-ACNC: 2.47 UIU/ML (ref 0.27–4.2)
VLDLC SERPL-MCNC: 26 MG/DL (ref 5–40)
WBC NRBC COR # BLD: 5.51 10*3/MM3 (ref 3.4–10.8)

## 2023-11-09 PROCEDURE — 83036 HEMOGLOBIN GLYCOSYLATED A1C: CPT

## 2023-11-09 PROCEDURE — 80061 LIPID PANEL: CPT

## 2023-11-09 PROCEDURE — 36415 COLL VENOUS BLD VENIPUNCTURE: CPT

## 2023-11-09 PROCEDURE — 85610 PROTHROMBIN TIME: CPT

## 2023-11-09 PROCEDURE — 86803 HEPATITIS C AB TEST: CPT

## 2023-11-09 PROCEDURE — 80050 GENERAL HEALTH PANEL: CPT

## 2023-11-09 PROCEDURE — G0103 PSA SCREENING: HCPCS

## 2023-11-09 PROCEDURE — 84439 ASSAY OF FREE THYROXINE: CPT

## 2023-12-05 DIAGNOSIS — Z96.652 AFTERCARE FOLLOWING LEFT KNEE JOINT REPLACEMENT SURGERY: Primary | ICD-10-CM

## 2023-12-05 DIAGNOSIS — Z47.1 AFTERCARE FOLLOWING LEFT KNEE JOINT REPLACEMENT SURGERY: Primary | ICD-10-CM

## 2023-12-06 ENCOUNTER — OFFICE VISIT (OUTPATIENT)
Dept: FAMILY MEDICINE CLINIC | Facility: CLINIC | Age: 53
End: 2023-12-06
Payer: COMMERCIAL

## 2023-12-06 ENCOUNTER — PRE-ADMISSION TESTING (OUTPATIENT)
Dept: PREADMISSION TESTING | Facility: HOSPITAL | Age: 53
End: 2023-12-06
Payer: COMMERCIAL

## 2023-12-06 VITALS
RESPIRATION RATE: 18 BRPM | TEMPERATURE: 97.6 F | BODY MASS INDEX: 41.66 KG/M2 | OXYGEN SATURATION: 94 % | HEART RATE: 63 BPM | HEIGHT: 70 IN | WEIGHT: 291.01 LBS

## 2023-12-06 VITALS
SYSTOLIC BLOOD PRESSURE: 145 MMHG | DIASTOLIC BLOOD PRESSURE: 89 MMHG | OXYGEN SATURATION: 97 % | WEIGHT: 298 LBS | TEMPERATURE: 98 F | HEART RATE: 71 BPM | HEIGHT: 70 IN | BODY MASS INDEX: 42.66 KG/M2

## 2023-12-06 DIAGNOSIS — M17.12 PRIMARY OSTEOARTHRITIS OF LEFT KNEE: ICD-10-CM

## 2023-12-06 DIAGNOSIS — Z01.818 PREOP TESTING: Primary | ICD-10-CM

## 2023-12-06 DIAGNOSIS — Z01.818 PREOP TESTING: ICD-10-CM

## 2023-12-06 DIAGNOSIS — I10 PRIMARY HYPERTENSION: ICD-10-CM

## 2023-12-06 DIAGNOSIS — E66.01 CLASS 2 SEVERE OBESITY WITH SERIOUS COMORBIDITY AND BODY MASS INDEX (BMI) OF 39.0 TO 39.9 IN ADULT, UNSPECIFIED OBESITY TYPE: Primary | ICD-10-CM

## 2023-12-06 LAB
ALBUMIN SERPL-MCNC: 4.3 G/DL (ref 3.5–5.2)
ALBUMIN/GLOB SERPL: 1.4 G/DL
ALP SERPL-CCNC: 84 U/L (ref 39–117)
ALT SERPL W P-5'-P-CCNC: 34 U/L (ref 1–41)
ANION GAP SERPL CALCULATED.3IONS-SCNC: 11.5 MMOL/L (ref 5–15)
AST SERPL-CCNC: 26 U/L (ref 1–40)
BASOPHILS # BLD AUTO: 0.03 10*3/MM3 (ref 0–0.2)
BASOPHILS NFR BLD AUTO: 0.5 % (ref 0–1.5)
BILIRUB SERPL-MCNC: 0.3 MG/DL (ref 0–1.2)
BUN SERPL-MCNC: 11 MG/DL (ref 6–20)
BUN/CREAT SERPL: 17.5 (ref 7–25)
CALCIUM SPEC-SCNC: 9.4 MG/DL (ref 8.6–10.5)
CHLORIDE SERPL-SCNC: 104 MMOL/L (ref 98–107)
CO2 SERPL-SCNC: 22.5 MMOL/L (ref 22–29)
CREAT SERPL-MCNC: 0.63 MG/DL (ref 0.76–1.27)
DEPRECATED RDW RBC AUTO: 39 FL (ref 37–54)
EGFRCR SERPLBLD CKD-EPI 2021: 112.3 ML/MIN/1.73
EOSINOPHIL # BLD AUTO: 0.14 10*3/MM3 (ref 0–0.4)
EOSINOPHIL NFR BLD AUTO: 2.6 % (ref 0.3–6.2)
ERYTHROCYTE [DISTWIDTH] IN BLOOD BY AUTOMATED COUNT: 12.8 % (ref 12.3–15.4)
GLOBULIN UR ELPH-MCNC: 3.1 GM/DL
GLUCOSE SERPL-MCNC: 106 MG/DL (ref 65–99)
HBA1C MFR BLD: 5.6 % (ref 4.8–5.6)
HCT VFR BLD AUTO: 42.1 % (ref 37.5–51)
HGB BLD-MCNC: 14.1 G/DL (ref 13–17.7)
IMM GRANULOCYTES # BLD AUTO: 0.02 10*3/MM3 (ref 0–0.05)
IMM GRANULOCYTES NFR BLD AUTO: 0.4 % (ref 0–0.5)
INR PPP: 0.98 (ref 0.86–1.15)
LYMPHOCYTES # BLD AUTO: 1.85 10*3/MM3 (ref 0.7–3.1)
LYMPHOCYTES NFR BLD AUTO: 33.8 % (ref 19.6–45.3)
MCH RBC QN AUTO: 28.5 PG (ref 26.6–33)
MCHC RBC AUTO-ENTMCNC: 33.5 G/DL (ref 31.5–35.7)
MCV RBC AUTO: 85.1 FL (ref 79–97)
MONOCYTES # BLD AUTO: 0.44 10*3/MM3 (ref 0.1–0.9)
MONOCYTES NFR BLD AUTO: 8 % (ref 5–12)
NEUTROPHILS NFR BLD AUTO: 2.99 10*3/MM3 (ref 1.7–7)
NEUTROPHILS NFR BLD AUTO: 54.7 % (ref 42.7–76)
NRBC BLD AUTO-RTO: 0 /100 WBC (ref 0–0.2)
PLATELET # BLD AUTO: 239 10*3/MM3 (ref 140–450)
PMV BLD AUTO: 10.5 FL (ref 6–12)
POTASSIUM SERPL-SCNC: 4.1 MMOL/L (ref 3.5–5.2)
PROT SERPL-MCNC: 7.4 G/DL (ref 6–8.5)
PROTHROMBIN TIME: 13.1 SECONDS (ref 11.8–14.9)
QT INTERVAL: 404 MS
QTC INTERVAL: 418 MS
RBC # BLD AUTO: 4.95 10*6/MM3 (ref 4.14–5.8)
SODIUM SERPL-SCNC: 138 MMOL/L (ref 136–145)
WBC NRBC COR # BLD AUTO: 5.47 10*3/MM3 (ref 3.4–10.8)

## 2023-12-06 PROCEDURE — 85610 PROTHROMBIN TIME: CPT

## 2023-12-06 PROCEDURE — 36415 COLL VENOUS BLD VENIPUNCTURE: CPT

## 2023-12-06 PROCEDURE — 80053 COMPREHEN METABOLIC PANEL: CPT

## 2023-12-06 PROCEDURE — 83036 HEMOGLOBIN GLYCOSYLATED A1C: CPT

## 2023-12-06 PROCEDURE — 85025 COMPLETE CBC W/AUTO DIFF WBC: CPT

## 2023-12-06 PROCEDURE — 93005 ELECTROCARDIOGRAM TRACING: CPT

## 2023-12-06 RX ORDER — AMLODIPINE BESYLATE 10 MG/1
10 TABLET ORAL DAILY
Qty: 90 TABLET | Refills: 1 | Status: SHIPPED | OUTPATIENT
Start: 2023-12-06

## 2023-12-06 RX ORDER — SULINDAC 200 MG/1
200 TABLET ORAL 2 TIMES DAILY PRN
COMMUNITY

## 2023-12-06 RX ORDER — NABUMETONE 750 MG/1
750 TABLET, FILM COATED ORAL 2 TIMES DAILY PRN
COMMUNITY

## 2023-12-06 NOTE — ASSESSMENT & PLAN NOTE
Patient's (Body mass index is 42.76 kg/m².) indicates that they are morbidly/severely obese (BMI > 40 or > 35 with obesity - related health condition) with health conditions that include hypertension . Weight is unchanged. BMI  is above average; BMI management plan is completed. We discussed portion control and increasing exercise.

## 2023-12-06 NOTE — DISCHARGE INSTRUCTIONS
IMPORTANT INSTRUCTIONS - PRE-ADMISSION TESTING  DO NOT EAT OR CHEW anything after midnight the night before your procedure.    You may have CLEAR liquids up to 3_ hours prior to ARRIVAL time.   Take the following medications the morning of your procedure with JUST A SIP OF WATER: AMLODIPINE, ALLOPURINOL    DO NOT BRING your medications to the hospital with you, UNLESS something has changed since your PRE-Admission Testing appointment.  Hold all vitamins, supplements, and NSAIDS (Non- steroidal anti-inflammatory meds) for one week prior to surgery (you MAY take Tylenol or Acetaminophen).  If you are diabetic, check your blood sugar the morning of your procedure. If it is less than 70 or if you are feeling symptomatic, call the following number for further instructions: [phone] SAME DAY SURGERY.  Use your inhalers/nebulizers as usual, the morning of your procedure. BRING YOUR INHALERS with you.   Bring your CPAP or BIPAP to hospital, ONLY IF YOU WILL BE SPENDING THE NIGHT.   Make sure you have a ride home and have someone who will stay with you the day of your procedure after you go home.  If you have any questions, please call your Pre-Admission Testing NurseBEATRICE at [phone].   Per anesthesia request, do not smoke for 24 hours before your procedure or as instructed by your surgeon.    Clear Liquid Diet        Find out when you need to start a clear liquid diet.   Think of “clear liquids” as anything you could read a newspaper through. This includes things like water, broth, sports drinks, or tea WITHOUT any kind of milk or cream.           Once you are told to start a clear liquid diet, only drink these things until 3 hours before arrival to the hospital or when the hospital says to stop. Total volume limitation: 8 oz.       Clear liquids you CAN drink:   Water   Clear broth: beef, chicken, vegetable, or bone broth with nothing in it   Gatorade   Lemonade or Harinder-aid   Soda   Tea, coffee (NO cream or  honey)   Jell-O (without fruit)   Popsicles (without fruit or cream)   Italian ices   Juice without pulp: apple, white, grape   You may use salt, pepper, and sugar    Do NOT drink:   Milk or cream   Soy milk, almond milk, coconut milk, or other non-dairy drinks and   creamers   Milkshakes or smoothies   Tomato juice   Orange juice   Grapefruit juice   Cream soups or any other than broth         Clear Liquid Diet:  Do NOT eat any solid food.  Do NOT eat or suck on mints or candy.  Do NOT chew gum.  Do NOT drink thick liquids like milk or juice with pulp in it.  Do NOT add milk, cream, or anything like soy milk or almond milk to coffee or tea.

## 2023-12-06 NOTE — PROGRESS NOTES
Chief Complaint   Patient presents with    Follow-up     One month follow up        Subjective          Basim Austin presents to [facility]    History of Present Illness  Basim is here to be seen for a one month follow up. He started norvasc then and his BP has improved from 161/93 to 145/89. He is going to continue on this medication at this time. He has no complaints today aside from his knee hurting. He has surgery planned for next week for a total knee replacement.     Hypertension  This is a recurrent problem. The current episode started more than 1 year ago. The problem has been waxing and waning since onset. Pertinent negatives include no anxiety, blurred vision, chest pain, headaches, malaise/fatigue, orthopnea, palpitations, peripheral edema or shortness of breath. There are no associated agents to hypertension. There are no compliance problems.        Past History:  Medical History: has a past medical history of Arthritis (), Hypertension (), and Knee swelling (2023).   Surgical History: has a past surgical history that includes Hernia repair () and Colonoscopy (,,).   Family History: family history includes Cancer in his mother; Heart disease in his father; Hyperlipidemia in his father; Osteoporosis in his maternal grandmother, mother, and sister.   Social History: reports that he quit smoking about 23 months ago. His smoking use included cigars. He has never been exposed to tobacco smoke. He has never used smokeless tobacco. He reports that he does not drink alcohol and does not use drugs.  Allergies: Patient has no known allergies.  (Not in a hospital admission)       Social History     Socioeconomic History    Marital status:    Tobacco Use    Smoking status: Former     Types: Cigars     Quit date:      Years since quittin.9     Passive exposure: Never    Smokeless tobacco: Never    Tobacco comments:     Stopped Smoking  "11/01/2022   Vaping Use    Vaping Use: Never used   Substance and Sexual Activity    Alcohol use: Never    Drug use: Never    Sexual activity: Defer       There are no preventive care reminders to display for this patient.    Objective     Vital Signs:   /89 (BP Location: Left arm, Patient Position: Sitting)   Pulse 71   Temp 98 °F (36.7 °C) (Infrared)   Ht 177.8 cm (70\")   Wt 135 kg (298 lb)   SpO2 97%   BMI 42.76 kg/m²       Physical Exam  Constitutional:       Appearance: Normal appearance.   HENT:      Nose: Nose normal.      Mouth/Throat:      Mouth: Mucous membranes are moist.   Cardiovascular:      Rate and Rhythm: Normal rate and regular rhythm.      Pulses: Normal pulses.      Heart sounds: Normal heart sounds.   Pulmonary:      Effort: Pulmonary effort is normal.      Breath sounds: Normal breath sounds.   Skin:     General: Skin is warm and dry.   Neurological:      General: No focal deficit present.      Mental Status: He is alert and oriented to person, place, and time.   Psychiatric:         Mood and Affect: Mood normal.         Behavior: Behavior normal.          Review of Systems   Constitutional:  Negative for malaise/fatigue.   Eyes:  Negative for blurred vision.   Respiratory:  Negative for shortness of breath.    Cardiovascular:  Negative for chest pain, palpitations and orthopnea.        Result Review :                 Assessment and Plan    Diagnoses and all orders for this visit:    1. Class 2 severe obesity with serious comorbidity and body mass index (BMI) of 39.0 to 39.9 in adult, unspecified obesity type (Primary)  Assessment & Plan:  Patient's (Body mass index is 42.76 kg/m².) indicates that they are morbidly/severely obese (BMI > 40 or > 35 with obesity - related health condition) with health conditions that include hypertension . Weight is unchanged. BMI  is above average; BMI management plan is completed. We discussed portion control and increasing exercise.       2. " Primary hypertension  Assessment & Plan:  Hypertension is improving with treatment.  Continue current treatment regimen.  Blood pressure will be reassessed in 3 months.    Orders:  -     amLODIPine (NORVASC) 10 MG tablet; Take 1 tablet by mouth Daily.  Dispense: 90 tablet; Refill: 1          Pt thought to be clinically stable at this time.    Follow Up   Return in about 4 weeks (around 1/3/2024), or if symptoms worsen or fail to improve.  Patient was given instructions and counseling regarding his condition or for health maintenance advice. Please see specific information pulled into the AVS if appropriate.

## 2023-12-07 ENCOUNTER — PATIENT ROUNDING (BHMG ONLY) (OUTPATIENT)
Dept: FAMILY MEDICINE CLINIC | Facility: CLINIC | Age: 53
End: 2023-12-07
Payer: COMMERCIAL

## 2023-12-08 ENCOUNTER — ANESTHESIA EVENT (OUTPATIENT)
Dept: PERIOP | Facility: HOSPITAL | Age: 53
End: 2023-12-08
Payer: COMMERCIAL

## 2023-12-13 ENCOUNTER — ANESTHESIA EVENT CONVERTED (OUTPATIENT)
Dept: ANESTHESIOLOGY | Facility: HOSPITAL | Age: 53
End: 2023-12-13
Payer: COMMERCIAL

## 2023-12-13 ENCOUNTER — APPOINTMENT (OUTPATIENT)
Dept: GENERAL RADIOLOGY | Facility: HOSPITAL | Age: 53
End: 2023-12-13
Payer: COMMERCIAL

## 2023-12-13 ENCOUNTER — HOSPITAL ENCOUNTER (OUTPATIENT)
Facility: HOSPITAL | Age: 53
Discharge: HOME | End: 2023-12-14
Attending: ORTHOPAEDIC SURGERY | Admitting: ORTHOPAEDIC SURGERY
Payer: COMMERCIAL

## 2023-12-13 ENCOUNTER — ANESTHESIA (OUTPATIENT)
Dept: PERIOP | Facility: HOSPITAL | Age: 53
End: 2023-12-13
Payer: COMMERCIAL

## 2023-12-13 DIAGNOSIS — R26.2 DIFFICULTY IN WALKING: ICD-10-CM

## 2023-12-13 DIAGNOSIS — M17.12 PRIMARY OSTEOARTHRITIS OF LEFT KNEE: Primary | ICD-10-CM

## 2023-12-13 PROCEDURE — 99232 SBSQ HOSP IP/OBS MODERATE 35: CPT | Performed by: HOSPITALIST

## 2023-12-13 PROCEDURE — 25010000002 KETOROLAC TROMETHAMINE PER 15 MG: Performed by: ORTHOPAEDIC SURGERY

## 2023-12-13 PROCEDURE — 25010000002 MEPERIDINE PER 100 MG: Performed by: MARRIAGE & FAMILY THERAPIST

## 2023-12-13 PROCEDURE — 0 HYDROMORPHONE 1 MG/ML SOLUTION: Performed by: MARRIAGE & FAMILY THERAPIST

## 2023-12-13 PROCEDURE — C1776 JOINT DEVICE (IMPLANTABLE): HCPCS | Performed by: ORTHOPAEDIC SURGERY

## 2023-12-13 PROCEDURE — 25810000003 SODIUM CHLORIDE 0.9 % SOLUTION: Performed by: ORTHOPAEDIC SURGERY

## 2023-12-13 PROCEDURE — 25010000002 SUGAMMADEX 200 MG/2ML SOLUTION: Performed by: MARRIAGE & FAMILY THERAPIST

## 2023-12-13 PROCEDURE — 25010000002 HYDROMORPHONE 1 MG/ML SOLUTION: Performed by: MARRIAGE & FAMILY THERAPIST

## 2023-12-13 PROCEDURE — 27447 TOTAL KNEE ARTHROPLASTY: CPT | Performed by: ORTHOPAEDIC SURGERY

## 2023-12-13 PROCEDURE — C1713 ANCHOR/SCREW BN/BN,TIS/BN: HCPCS | Performed by: ORTHOPAEDIC SURGERY

## 2023-12-13 PROCEDURE — 27447 TOTAL KNEE ARTHROPLASTY: CPT | Performed by: PHYSICIAN ASSISTANT

## 2023-12-13 PROCEDURE — 25010000002 ROPIVACAINE PER 1 MG: Performed by: ORTHOPAEDIC SURGERY

## 2023-12-13 PROCEDURE — 25810000003 LACTATED RINGERS PER 1000 ML: Performed by: ANESTHESIOLOGY

## 2023-12-13 PROCEDURE — 25010000002 MIDAZOLAM PER 1MG: Performed by: ANESTHESIOLOGY

## 2023-12-13 PROCEDURE — 25010000002 EPINEPHRINE 1 MG/ML SOLUTION: Performed by: ORTHOPAEDIC SURGERY

## 2023-12-13 PROCEDURE — 25010000002 MORPHINE PER 10 MG: Performed by: ORTHOPAEDIC SURGERY

## 2023-12-13 PROCEDURE — 25010000002 CEFAZOLIN PER 500 MG: Performed by: ORTHOPAEDIC SURGERY

## 2023-12-13 PROCEDURE — 25010000002 DEXAMETHASONE PER 1 MG: Performed by: MARRIAGE & FAMILY THERAPIST

## 2023-12-13 PROCEDURE — 25010000002 PROPOFOL 10 MG/ML EMULSION: Performed by: MARRIAGE & FAMILY THERAPIST

## 2023-12-13 PROCEDURE — 25010000002 ONDANSETRON PER 1 MG: Performed by: MARRIAGE & FAMILY THERAPIST

## 2023-12-13 PROCEDURE — 25010000002 CEFAZOLIN PER 500 MG: Performed by: PHYSICIAN ASSISTANT

## 2023-12-13 PROCEDURE — 73560 X-RAY EXAM OF KNEE 1 OR 2: CPT

## 2023-12-13 PROCEDURE — 94799 UNLISTED PULMONARY SVC/PX: CPT

## 2023-12-13 PROCEDURE — 25010000002 KETOROLAC TROMETHAMINE PER 15 MG: Performed by: MARRIAGE & FAMILY THERAPIST

## 2023-12-13 PROCEDURE — 25010000002 FENTANYL CITRATE (PF) 50 MCG/ML SOLUTION: Performed by: MARRIAGE & FAMILY THERAPIST

## 2023-12-13 DEVICE — CAP TOTL KN CMT PRIMARY W/ROSA: Type: IMPLANTABLE DEVICE | Site: KNEE | Status: FUNCTIONAL

## 2023-12-13 DEVICE — CMT BONE PALACOS R HI/VISC 1X40: Type: IMPLANTABLE DEVICE | Site: KNEE | Status: FUNCTIONAL

## 2023-12-13 DEVICE — ART/SRF KN PERSONA/VE PS EF 8TO11 11MM LT: Type: IMPLANTABLE DEVICE | Site: KNEE | Status: FUNCTIONAL

## 2023-12-13 DEVICE — STEM TIB/KN PERSONA CMT 5D SZE LT: Type: IMPLANTABLE DEVICE | Site: KNEE | Status: FUNCTIONAL

## 2023-12-13 DEVICE — IMPLANTABLE DEVICE: Type: IMPLANTABLE DEVICE | Site: KNEE | Status: FUNCTIONAL

## 2023-12-13 RX ORDER — CELECOXIB 100 MG/1
200 CAPSULE ORAL ONCE
Status: COMPLETED | OUTPATIENT
Start: 2023-12-13 | End: 2023-12-13

## 2023-12-13 RX ORDER — TRANEXAMIC ACID 10 MG/ML
1000 INJECTION, SOLUTION INTRAVENOUS ONCE
Status: COMPLETED | OUTPATIENT
Start: 2023-12-13 | End: 2023-12-13

## 2023-12-13 RX ORDER — CEFAZOLIN SODIUM IN 0.9 % NACL 3 G/100 ML
3 INTRAVENOUS SOLUTION, PIGGYBACK (ML) INTRAVENOUS ONCE
Status: COMPLETED | OUTPATIENT
Start: 2023-12-13 | End: 2023-12-13

## 2023-12-13 RX ORDER — PHENYLEPHRINE HCL IN 0.9% NACL 1 MG/10 ML
SYRINGE (ML) INTRAVENOUS AS NEEDED
Status: DISCONTINUED | OUTPATIENT
Start: 2023-12-13 | End: 2023-12-13 | Stop reason: SURG

## 2023-12-13 RX ORDER — OXYCODONE HYDROCHLORIDE 5 MG/1
5 TABLET ORAL
Status: COMPLETED | OUTPATIENT
Start: 2023-12-13 | End: 2023-12-13

## 2023-12-13 RX ORDER — FENTANYL CITRATE 50 UG/ML
INJECTION, SOLUTION INTRAMUSCULAR; INTRAVENOUS AS NEEDED
Status: DISCONTINUED | OUTPATIENT
Start: 2023-12-13 | End: 2023-12-13 | Stop reason: SURG

## 2023-12-13 RX ORDER — PROPOFOL 10 MG/ML
VIAL (ML) INTRAVENOUS AS NEEDED
Status: DISCONTINUED | OUTPATIENT
Start: 2023-12-13 | End: 2023-12-13 | Stop reason: SURG

## 2023-12-13 RX ORDER — EPHEDRINE SULFATE 50 MG/ML
INJECTION, SOLUTION INTRAVENOUS AS NEEDED
Status: DISCONTINUED | OUTPATIENT
Start: 2023-12-13 | End: 2023-12-13 | Stop reason: SURG

## 2023-12-13 RX ORDER — ROCURONIUM BROMIDE 10 MG/ML
INJECTION, SOLUTION INTRAVENOUS AS NEEDED
Status: DISCONTINUED | OUTPATIENT
Start: 2023-12-13 | End: 2023-12-13 | Stop reason: SURG

## 2023-12-13 RX ORDER — KETOROLAC TROMETHAMINE 30 MG/ML
15 INJECTION, SOLUTION INTRAMUSCULAR; INTRAVENOUS EVERY 6 HOURS
Qty: 4 ML | Refills: 0 | Status: COMPLETED | OUTPATIENT
Start: 2023-12-13 | End: 2023-12-14

## 2023-12-13 RX ORDER — ONDANSETRON 2 MG/ML
4 INJECTION INTRAMUSCULAR; INTRAVENOUS ONCE AS NEEDED
Status: DISCONTINUED | OUTPATIENT
Start: 2023-12-13 | End: 2023-12-13 | Stop reason: HOSPADM

## 2023-12-13 RX ORDER — GLYCOPYRROLATE 0.2 MG/ML
INJECTION INTRAMUSCULAR; INTRAVENOUS AS NEEDED
Status: DISCONTINUED | OUTPATIENT
Start: 2023-12-13 | End: 2023-12-13 | Stop reason: SURG

## 2023-12-13 RX ORDER — KETOROLAC TROMETHAMINE 30 MG/ML
INJECTION, SOLUTION INTRAMUSCULAR; INTRAVENOUS AS NEEDED
Status: DISCONTINUED | OUTPATIENT
Start: 2023-12-13 | End: 2023-12-13 | Stop reason: SURG

## 2023-12-13 RX ORDER — SODIUM CHLORIDE, SODIUM LACTATE, POTASSIUM CHLORIDE, CALCIUM CHLORIDE 600; 310; 30; 20 MG/100ML; MG/100ML; MG/100ML; MG/100ML
9 INJECTION, SOLUTION INTRAVENOUS CONTINUOUS PRN
Status: DISCONTINUED | OUTPATIENT
Start: 2023-12-13 | End: 2023-12-14 | Stop reason: HOSPADM

## 2023-12-13 RX ORDER — DEXAMETHASONE SODIUM PHOSPHATE 4 MG/ML
INJECTION, SOLUTION INTRA-ARTICULAR; INTRALESIONAL; INTRAMUSCULAR; INTRAVENOUS; SOFT TISSUE AS NEEDED
Status: DISCONTINUED | OUTPATIENT
Start: 2023-12-13 | End: 2023-12-13 | Stop reason: SURG

## 2023-12-13 RX ORDER — TRANEXAMIC ACID 10 MG/ML
1000 INJECTION, SOLUTION INTRAVENOUS ONCE
Status: DISCONTINUED | OUTPATIENT
Start: 2023-12-13 | End: 2023-12-13 | Stop reason: HOSPADM

## 2023-12-13 RX ORDER — ONDANSETRON 4 MG/1
4 TABLET, FILM COATED ORAL EVERY 6 HOURS PRN
Status: DISCONTINUED | OUTPATIENT
Start: 2023-12-13 | End: 2023-12-14 | Stop reason: HOSPADM

## 2023-12-13 RX ORDER — DEXMEDETOMIDINE HYDROCHLORIDE 100 UG/ML
INJECTION, SOLUTION INTRAVENOUS AS NEEDED
Status: DISCONTINUED | OUTPATIENT
Start: 2023-12-13 | End: 2023-12-13 | Stop reason: SURG

## 2023-12-13 RX ORDER — BUPIVACAINE HYDROCHLORIDE AND EPINEPHRINE 5; 5 MG/ML; UG/ML
INJECTION, SOLUTION EPIDURAL; INTRACAUDAL; PERINEURAL
Status: COMPLETED | OUTPATIENT
Start: 2023-12-13 | End: 2023-12-13

## 2023-12-13 RX ORDER — ONDANSETRON 2 MG/ML
INJECTION INTRAMUSCULAR; INTRAVENOUS AS NEEDED
Status: DISCONTINUED | OUTPATIENT
Start: 2023-12-13 | End: 2023-12-13 | Stop reason: SURG

## 2023-12-13 RX ORDER — PROMETHAZINE HYDROCHLORIDE 25 MG/1
25 SUPPOSITORY RECTAL ONCE AS NEEDED
Status: DISCONTINUED | OUTPATIENT
Start: 2023-12-13 | End: 2023-12-13 | Stop reason: HOSPADM

## 2023-12-13 RX ORDER — SODIUM CHLORIDE 9 MG/ML
100 INJECTION, SOLUTION INTRAVENOUS CONTINUOUS
Status: DISCONTINUED | OUTPATIENT
Start: 2023-12-13 | End: 2023-12-14 | Stop reason: HOSPADM

## 2023-12-13 RX ORDER — ACETAMINOPHEN 500 MG
1000 TABLET ORAL ONCE
Status: COMPLETED | OUTPATIENT
Start: 2023-12-13 | End: 2023-12-13

## 2023-12-13 RX ORDER — MEPERIDINE HYDROCHLORIDE 25 MG/ML
12.5 INJECTION INTRAMUSCULAR; INTRAVENOUS; SUBCUTANEOUS
Status: DISCONTINUED | OUTPATIENT
Start: 2023-12-13 | End: 2023-12-13 | Stop reason: HOSPADM

## 2023-12-13 RX ORDER — SODIUM CHLORIDE 9 MG/ML
40 INJECTION, SOLUTION INTRAVENOUS AS NEEDED
Status: DISCONTINUED | OUTPATIENT
Start: 2023-12-13 | End: 2023-12-13 | Stop reason: HOSPADM

## 2023-12-13 RX ORDER — NALOXONE HCL 0.4 MG/ML
0.4 VIAL (ML) INJECTION
Status: DISCONTINUED | OUTPATIENT
Start: 2023-12-13 | End: 2023-12-14 | Stop reason: HOSPADM

## 2023-12-13 RX ORDER — CEFAZOLIN SODIUM IN 0.9 % NACL 3 G/100 ML
3 INTRAVENOUS SOLUTION, PIGGYBACK (ML) INTRAVENOUS EVERY 8 HOURS
Qty: 200 ML | Refills: 0 | Status: COMPLETED | OUTPATIENT
Start: 2023-12-13 | End: 2023-12-14

## 2023-12-13 RX ORDER — MIDAZOLAM HYDROCHLORIDE 2 MG/2ML
2 INJECTION, SOLUTION INTRAMUSCULAR; INTRAVENOUS ONCE
Status: COMPLETED | OUTPATIENT
Start: 2023-12-13 | End: 2023-12-13

## 2023-12-13 RX ORDER — HYDROCODONE BITARTRATE AND ACETAMINOPHEN 7.5; 325 MG/1; MG/1
1 TABLET ORAL EVERY 4 HOURS PRN
Status: DISCONTINUED | OUTPATIENT
Start: 2023-12-13 | End: 2023-12-14 | Stop reason: HOSPADM

## 2023-12-13 RX ORDER — ACETAMINOPHEN 500 MG
1000 TABLET ORAL EVERY 8 HOURS
Status: DISCONTINUED | OUTPATIENT
Start: 2023-12-13 | End: 2023-12-14 | Stop reason: HOSPADM

## 2023-12-13 RX ORDER — LOSARTAN POTASSIUM 50 MG/1
100 TABLET ORAL DAILY
Status: DISCONTINUED | OUTPATIENT
Start: 2023-12-13 | End: 2023-12-13

## 2023-12-13 RX ORDER — SODIUM CHLORIDE 0.9 % (FLUSH) 0.9 %
10 SYRINGE (ML) INJECTION AS NEEDED
Status: DISCONTINUED | OUTPATIENT
Start: 2023-12-13 | End: 2023-12-13 | Stop reason: HOSPADM

## 2023-12-13 RX ORDER — ALLOPURINOL 100 MG/1
100 TABLET ORAL DAILY
Status: DISCONTINUED | OUTPATIENT
Start: 2023-12-13 | End: 2023-12-14 | Stop reason: HOSPADM

## 2023-12-13 RX ORDER — KETAMINE HCL IN NACL, ISO-OSM 100MG/10ML
SYRINGE (ML) INJECTION AS NEEDED
Status: DISCONTINUED | OUTPATIENT
Start: 2023-12-13 | End: 2023-12-13 | Stop reason: SURG

## 2023-12-13 RX ORDER — CEFAZOLIN SODIUM 2 G/100ML
2 INJECTION, SOLUTION INTRAVENOUS ONCE
Status: DISCONTINUED | OUTPATIENT
Start: 2023-12-13 | End: 2023-12-13

## 2023-12-13 RX ORDER — LOSARTAN POTASSIUM 50 MG/1
100 TABLET ORAL DAILY
Status: DISCONTINUED | OUTPATIENT
Start: 2023-12-14 | End: 2023-12-14 | Stop reason: HOSPADM

## 2023-12-13 RX ORDER — MAGNESIUM HYDROXIDE 1200 MG/15ML
LIQUID ORAL AS NEEDED
Status: DISCONTINUED | OUTPATIENT
Start: 2023-12-13 | End: 2023-12-13 | Stop reason: HOSPADM

## 2023-12-13 RX ORDER — AMLODIPINE BESYLATE 5 MG/1
10 TABLET ORAL DAILY
Status: DISCONTINUED | OUTPATIENT
Start: 2023-12-14 | End: 2023-12-14 | Stop reason: HOSPADM

## 2023-12-13 RX ORDER — LIDOCAINE HYDROCHLORIDE 20 MG/ML
INJECTION, SOLUTION EPIDURAL; INFILTRATION; INTRACAUDAL; PERINEURAL AS NEEDED
Status: DISCONTINUED | OUTPATIENT
Start: 2023-12-13 | End: 2023-12-13 | Stop reason: SURG

## 2023-12-13 RX ORDER — HYDROCODONE BITARTRATE AND ACETAMINOPHEN 7.5; 325 MG/1; MG/1
2 TABLET ORAL EVERY 4 HOURS PRN
Status: DISCONTINUED | OUTPATIENT
Start: 2023-12-13 | End: 2023-12-14 | Stop reason: HOSPADM

## 2023-12-13 RX ORDER — PROMETHAZINE HYDROCHLORIDE 12.5 MG/1
25 TABLET ORAL ONCE AS NEEDED
Status: DISCONTINUED | OUTPATIENT
Start: 2023-12-13 | End: 2023-12-13 | Stop reason: HOSPADM

## 2023-12-13 RX ORDER — ONDANSETRON 2 MG/ML
4 INJECTION INTRAMUSCULAR; INTRAVENOUS EVERY 6 HOURS PRN
Status: DISCONTINUED | OUTPATIENT
Start: 2023-12-13 | End: 2023-12-14 | Stop reason: HOSPADM

## 2023-12-13 RX ADMIN — DEXAMETHASONE SODIUM PHOSPHATE 8 MG: 4 INJECTION, SOLUTION INTRAMUSCULAR; INTRAVENOUS at 14:29

## 2023-12-13 RX ADMIN — ACETAMINOPHEN 1000 MG: 500 TABLET ORAL at 11:24

## 2023-12-13 RX ADMIN — Medication 10 MG: at 14:35

## 2023-12-13 RX ADMIN — CELECOXIB 200 MG: 100 CAPSULE ORAL at 11:24

## 2023-12-13 RX ADMIN — Medication 3 G: at 14:35

## 2023-12-13 RX ADMIN — EPHEDRINE SULFATE 5 MG: 50 INJECTION INTRAVENOUS at 15:54

## 2023-12-13 RX ADMIN — KETOROLAC TROMETHAMINE 15 MG: 30 INJECTION, SOLUTION INTRAMUSCULAR; INTRAVENOUS at 23:32

## 2023-12-13 RX ADMIN — KETOROLAC TROMETHAMINE 15 MG: 30 INJECTION, SOLUTION INTRAMUSCULAR; INTRAVENOUS at 18:30

## 2023-12-13 RX ADMIN — EPHEDRINE SULFATE 5 MG: 50 INJECTION INTRAVENOUS at 15:19

## 2023-12-13 RX ADMIN — ONDANSETRON 4 MG: 2 INJECTION INTRAMUSCULAR; INTRAVENOUS at 16:07

## 2023-12-13 RX ADMIN — Medication 30 MG: at 15:00

## 2023-12-13 RX ADMIN — GLYCOPYRROLATE 0.1 MG: 0.2 INJECTION INTRAMUSCULAR; INTRAVENOUS at 14:29

## 2023-12-13 RX ADMIN — SODIUM CHLORIDE 100 ML/HR: 9 INJECTION, SOLUTION INTRAVENOUS at 23:25

## 2023-12-13 RX ADMIN — EPHEDRINE SULFATE 5 MG: 50 INJECTION INTRAVENOUS at 15:17

## 2023-12-13 RX ADMIN — CEFAZOLIN SODIUM 3 G: 10 INJECTION, POWDER, FOR SOLUTION INTRAVENOUS at 22:53

## 2023-12-13 RX ADMIN — OXYCODONE 5 MG: 5 TABLET ORAL at 16:51

## 2023-12-13 RX ADMIN — ROCURONIUM BROMIDE 50 MG: 50 INJECTION INTRAVENOUS at 14:29

## 2023-12-13 RX ADMIN — BUPIVACAINE HYDROCHLORIDE AND EPINEPHRINE BITARTRATE 30 ML: 5; .005 INJECTION, SOLUTION EPIDURAL; INTRACAUDAL; PERINEURAL at 13:12

## 2023-12-13 RX ADMIN — OXYCODONE 5 MG: 5 TABLET ORAL at 17:07

## 2023-12-13 RX ADMIN — MEPERIDINE HYDROCHLORIDE 12.5 MG: 25 INJECTION INTRAMUSCULAR; INTRAVENOUS; SUBCUTANEOUS at 16:56

## 2023-12-13 RX ADMIN — DEXMEDETOMIDINE 20 MCG: 100 INJECTION, SOLUTION, CONCENTRATE INTRAVENOUS at 16:24

## 2023-12-13 RX ADMIN — PROPOFOL 50 MG: 10 INJECTION, EMULSION INTRAVENOUS at 16:10

## 2023-12-13 RX ADMIN — HYDROMORPHONE HYDROCHLORIDE 0.5 MG: 1 INJECTION, SOLUTION INTRAMUSCULAR; INTRAVENOUS; SUBCUTANEOUS at 16:38

## 2023-12-13 RX ADMIN — HYDROMORPHONE HYDROCHLORIDE 0.5 MG: 1 INJECTION, SOLUTION INTRAMUSCULAR; INTRAVENOUS; SUBCUTANEOUS at 16:46

## 2023-12-13 RX ADMIN — EPHEDRINE SULFATE 5 MG: 50 INJECTION INTRAVENOUS at 15:26

## 2023-12-13 RX ADMIN — HYDROMORPHONE HYDROCHLORIDE 0.5 MG: 1 INJECTION, SOLUTION INTRAMUSCULAR; INTRAVENOUS; SUBCUTANEOUS at 15:05

## 2023-12-13 RX ADMIN — PROPOFOL 20 MG: 10 INJECTION, EMULSION INTRAVENOUS at 16:18

## 2023-12-13 RX ADMIN — SODIUM CHLORIDE, POTASSIUM CHLORIDE, SODIUM LACTATE AND CALCIUM CHLORIDE: 600; 310; 30; 20 INJECTION, SOLUTION INTRAVENOUS at 14:51

## 2023-12-13 RX ADMIN — ALLOPURINOL 100 MG: 100 TABLET ORAL at 19:11

## 2023-12-13 RX ADMIN — HYDROMORPHONE HYDROCHLORIDE 0.25 MG: 1 INJECTION, SOLUTION INTRAMUSCULAR; INTRAVENOUS; SUBCUTANEOUS at 17:10

## 2023-12-13 RX ADMIN — LIDOCAINE HYDROCHLORIDE 50 MG: 20 INJECTION, SOLUTION EPIDURAL; INFILTRATION; INTRACAUDAL; PERINEURAL at 16:07

## 2023-12-13 RX ADMIN — SODIUM CHLORIDE, POTASSIUM CHLORIDE, SODIUM LACTATE AND CALCIUM CHLORIDE 9 ML/HR: 600; 310; 30; 20 INJECTION, SOLUTION INTRAVENOUS at 11:26

## 2023-12-13 RX ADMIN — Medication 100 MCG: at 15:54

## 2023-12-13 RX ADMIN — KETOROLAC TROMETHAMINE 30 MG: 30 INJECTION, SOLUTION INTRAMUSCULAR; INTRAVENOUS at 16:07

## 2023-12-13 RX ADMIN — SUGAMMADEX 200 MG: 100 INJECTION, SOLUTION INTRAVENOUS at 16:07

## 2023-12-13 RX ADMIN — HYDROCODONE BITARTRATE AND ACETAMINOPHEN 2 TABLET: 7.5; 325 TABLET ORAL at 18:41

## 2023-12-13 RX ADMIN — TRANEXAMIC ACID 1000 MG: 10 INJECTION, SOLUTION INTRAVENOUS at 12:46

## 2023-12-13 RX ADMIN — PROPOFOL 150 MG: 10 INJECTION, EMULSION INTRAVENOUS at 14:29

## 2023-12-13 RX ADMIN — LIDOCAINE HYDROCHLORIDE 50 MG: 20 INJECTION, SOLUTION EPIDURAL; INFILTRATION; INTRACAUDAL; PERINEURAL at 14:29

## 2023-12-13 RX ADMIN — Medication 10 MG: at 14:45

## 2023-12-13 RX ADMIN — PROPOFOL 20 MG: 10 INJECTION, EMULSION INTRAVENOUS at 16:24

## 2023-12-13 RX ADMIN — MIDAZOLAM HYDROCHLORIDE 2 MG: 1 INJECTION, SOLUTION INTRAMUSCULAR; INTRAVENOUS at 12:45

## 2023-12-13 RX ADMIN — FENTANYL CITRATE 100 MCG: 50 INJECTION, SOLUTION INTRAMUSCULAR; INTRAVENOUS at 14:29

## 2023-12-13 NOTE — ANESTHESIA PROCEDURE NOTES
Peripheral Block      Patient reassessed immediately prior to procedure    Patient location during procedure: pre-op  Start time: 12/13/2023 1:12 PM  Stop time: 12/13/2023 12:15 PM  Reason for block: at surgeon's request and post-op pain management  Performed by  Anesthesiologist: Celio Cobb MD  Preanesthetic Checklist  Completed: patient identified, IV checked, site marked, risks and benefits discussed, surgical consent, monitors and equipment checked, pre-op evaluation and timeout performed  Prep:  Pt Position: supine  Sterile barriers:alcohol skin prep, partial drape, cap, washed/disinfected hands, mask and gloves  Prep: ChloraPrep  Patient monitoring: blood pressure monitoring, continuous pulse oximetry and EKG  Procedure    Sedation: yes  Performed under: local infiltration  Guidance:ultrasound guided and nerve stimulator    ULTRASOUND INTERPRETATION.  Using ultrasound guidance a 20 G gauge needle was placed in close proximity to the nerve, at which point, under ultrasound guidance anesthetic was injected in the area of the nerve and spread of the anesthesia was seen on ultrasound in close proximity thereto.  There were no abnormalities seen on ultrasound; a digital image was taken; and the patient tolerated the procedure with no complications. Images:still images obtained, printed/placed on chart    Laterality:left  Block Type:adductor canal block  Injection Technique:single-shot  Needle Type:echogenic  Needle Gauge:20 G (4in)  Resistance on Injection: none    Medications Used: bupivacaine-EPINEPHrine PF (MARCAINE w/EPI) 0.5% -[id number] injection - Injection   30 mL - 12/13/2023 1:12:00 PM      Post Assessment  Injection Assessment: negative aspiration for heme, no paresthesia on injection and incremental injection  Patient Tolerance:comfortable throughout block  Complications:no  Additional Notes  The block or continuous infusion is requested by the referring physician for management of postoperative  pain, or pain related to a procedure. Ultrasound guidance (deemed medically necessary). Painless injection, pt was awake and conversant during the procedure without complications. Needle and surrounding structures visualized throughout procedure. No adverse reactions or complications seen during this period. Post-procedure image showed no signs of complication, and anatomy was consistent with an uncomplicated nerve blockade.

## 2023-12-13 NOTE — H&P
[facility]   HISTORY AND PHYSICAL    Patient Name: Basim Austin  : 1970  MRN: [id number]  Primary Care Physician:  Ai Red APRN  Date of admission: (Not on file)    Subjective   Subjective     Chief Complaint: Left knee pain    History of Present Illness: The patient is a 53-year-old male with left knee pain.  He has had persistent and worsening left knee pain that is worse with activity.  He wishes to undergo knee replacement surgery.  He reports pain and stiffness to the knee.    Review of Systems : Negative except for those mentioned in the history of present illness    Personal History     Past Medical History:   Diagnosis Date    Arthritis 2012    Hypertension 2005    Knee swelling 2023       Past Surgical History:   Procedure Laterality Date    COLONOSCOPY  ,,    Estimate    HERNIA REPAIR      Estimated        Family History: family history includes Cancer in his mother; Heart disease in his father; Hyperlipidemia in his father; Osteoporosis in his maternal grandmother, mother, and sister. Otherwise pertinent FHx was reviewed and not pertinent to current issue.    Social History:  reports that he quit smoking about 1 years ago. His smoking use included cigars. He has never been exposed to tobacco smoke. He has never used smokeless tobacco. He reports that he does not drink alcohol and does not use drugs.    Home Medications:  allopurinol, amLODIPine, losartan, nabumetone, and sulindac    Allergies:  No Known Allergies    Objective    Objective     Vitals:        Physical Exam  General: No apparent distress, alert and oriented x 3  HEENT: Normocephalic/atraumatic  Neck: Supple  Cardiovascular: Regular heart rate  Chest: Unlabored breathing, cardiovascular: Regular heart rate  Musculoskeletal: Neurovascular intact extremity.  Positive pulses.  Deformity to the knee.  Positive pulses.  Ambulates with antalgic gait.  Neurological: Grossly intact        Result  Review    Result Review:  I have personally reviewed the results from the time of this admission to 12/12/2023 21:00 EST and agree with these findings:  []  Laboratory list / accordion  []  Microbiology  [x]  Radiology  []  EKG/Telemetry   []  Cardiology/Vascular   []  Pathology  []  Old records  []  Other:  Most notable findings include: Left knee osteoarthritis      Assessment & Plan   Assessment / Plan     Brief Patient Summary:  Basim Austin is a 53 y.o. male who has left knee osteoarthritis    Active Hospital Problems:  Active Hospital Problems    Diagnosis     **Primary osteoarthritis of left knee      Plan: I discussed treatment options with the patient.  Operative versus nonoperative treatment was discussed.  Risks and benefits were discussed and informed consent was obtained.  The patient wishes to proceed with left total knee replacement.      DVT prophylaxis:  No DVT prophylaxis order currently exists.    CODE STATUS:       Admission Status:  I believe this patient meets outpatient status.    Ousmane Edwards MD

## 2023-12-13 NOTE — OP NOTE
TOTAL KNEE ARTHROPLASTY WITH LUCÍA ROBOT  Procedure Report    Patient Name:  Basim Austin  YOB: 1970    Date of Surgery:  12/13/2023     Indications:  The patient has had persistent knee pain and x-rays reveal advanced osteoarthritis.  They wish to proceed with operative treatment.  Risks and benefits of surgery were discussed.  Risk of bleeding, infection, damage to neurovascular structures, heart attack, stroke, DVT/PE, anesthesia complications including death, stiffness, instability, periprosthetic fracture, deep infection, among others were discussed.  Informed consent was obtained and they wish to proceed.      Pre-op Diagnosis:   Primary osteoarthritis of left knee [M17.12]       Post-Op Diagnosis Codes:     * Primary osteoarthritis of left knee [M17.12]    Procedure/CPT® Codes:      Procedure(s):  LEFT TOTAL KNEE ARTHROPLASTY WITH LUCÍA    Staff:  Surgeon(s):  Ousmane Edwards MD    Assistant: Salvador Longo PA; Edward Hernandez RN    Surgical Approach: Knee Medial Parapatellar        Anesthesia: General with Block    Estimated Blood Loss: 75 mL    Implants:    Implant Name Type Inv. Item Serial No.  Lot No. LRB No. Used Action   CMT BONE PALACOS R HI/VISC 1X40 - [id number] Implant CMT BONE PALACOS R HI/VISC 1X40  [facility] [id number] Left 1 Implanted   CMT BONE PALACOS R HI/VISC 1X40 - [id number] Implant CMT BONE PALACOS R HI/VISC 1X40  [facility] [id number] Left 1 Implanted   COMP FEM/KN PERSONA TIVANIUM CR CMT STD SZ9 LT - [id number] Implant COMP FEM/KN PERSONA TIVANIUM CR CMT STD SZ9 LT  [employer] [id number] Left 1 Implanted   STEM TIB/KN PERSONA CMT 5D ZORAIDA LT - [id number] Implant STEM TIB/KN PERSONA CMT 5D ZORAIDA LT  [employer] [id number] Left 1 Implanted   PAT KN PERSONA ALLPOLY CMT 8.5X32MM - [id number] Implant PAT KN PERSONA ALLPOLY CMT 8.5X32MM  [employer] [id number] Left 1 Implanted   ART/SRF KN PERSONA/VE PS EF 8TO11 11MM LT - [id number]  Implant ART/SRF KN PERSONA/REYNALDO PS EF 8TO11 11MM LT  [employer] [id number] Left 1 Implanted       Specimen:          None        Findings: knee osteoarthritis    Complications: None    Description of Procedure: The patient was brought to the operating room and placed supine on the OR table.  General anesthesia was given.  Preoperatively, the patient received an adductor canal nerve block. The patient was placed supine on the OR table.  All bony prominences were padded. The tourniquet was placed on the thigh. The affected lower extremity was prepped and draped in the usual sterile fashion. Preoperative antibiotic was given. Tranexamic acid intravenously was given at the beginning and the end of the surgery.  At this point, an Esmarch was used to exsanguinate the limb and the tourniquet was inflated. A longitudinal incision was made over the knee and a medial parapatellar arthrotomy was performed.  Appropriate releases were performed to the proximal medial tibia. The pre-patellar fat pad was excised.  The patella was subluxed laterally and the knee was examined. There was tricompartmental wear and osteophyte formation.  The medial and lateral menisci were removed.  Osteophytes of  the femur were debrided.     At this point 2 threaded guidepins were placed at the distal femur and the tracking sensor for the femur for the Jacquelin robot were placed.  2 percutaneous stab incisions were made in the proximal tibial shaft and 2 guidepins were placed for the tibial tracking censor was placed.  We then registered the mechanical axis and femoral and tibial landmarks for the Jacquelin Robot.  We then assessed the flexion extension gaps and the flexion and extension deformity.  We made our surgical plan and then executed the distal femoral cut, the 4-in-1 femoral cut and the tibial cut. The patella was then everted, resected, and sized.  Drill holes for the patella was made.  At this point the spacer block was placed in flexion extension  and fit well.     We then placed the knee in flexion where laminar spreaders were used to open the flexion gaps.  The menisci were removed.  Osteophytes were removed from the posterior femur. The posterior capsule was injected with anesthetic cocktail.  At this point, the appropriately sized tibial tray was chosen.  This was pinned into place in line with the medial one third of the tibial tubercle. We then placed the trial femur. The trial insert was placed and the knee was brought to full extension. It was stable to varus and valgus stress.   The knee was then trialed with range of motion again. The femoral drill holes were made. The tibia was finished with the drill and the punch.  The components were removed. The knee was irrigated and dried. The cement was mixed and the tibia and femur were cemented into place.  The medial congruent spacer was then inserted.  The patella was cemented into place. The knee was irrigated with Irrisept and normal saline Pulsavac lavage.  The medial congruent polyethylene was inserted. The knee was stable to varus and valgus stress. There was good balance to the ligaments medially and laterally. There was good flexion with a stable patella. At this point, the tourniquet was released.  There was minimal bleeding controlled with electrocautery.   The arthrotomy was closed with #1 Vicryl at 30 degrees of flexion, the subcutaneous tissues with 2-0 Vicryl, and the skin with staples.  The percutaneous stab incisions on the tibia were closed with 3-0 nylon in horizontal mattress fashion.  These incisions were covered with Xeroform, 4 x 4, Tegaderm.  An Aquacel dressing was placed and an Ace wrap was placed. Patient awoke from anesthesia in stable condition. There were no complications. All counts were correct.       Assistant: Salvador Longo PA; Edward Hernandez RN  was responsible for performing the following activities: Retraction, Suction, Irrigation, and Placing Dressing  and their skilled assistance was necessary for the success of this case.    Ousmane Edwards MD     Date: 12/13/2023  Time: 16:25 EST

## 2023-12-13 NOTE — ANESTHESIA POSTPROCEDURE EVALUATION
Patient: Basim Austin    Procedure Summary       Date: 12/13/23 Room / Location: [facility] OR 06 / [facility] MAIN OR    Anesthesia Start: 1425 Anesthesia Stop: 1632    Procedure: LEFT TOTAL KNEE ARTHROPLASTY WITH LUCÍA (Left: Knee) Diagnosis:       Primary osteoarthritis of left knee      (Primary osteoarthritis of left knee [M17.12])    Surgeons: Ousmane Edwards MD Provider: Alex Titus MD    Anesthesia Type: general with block ASA Status: 2            Anesthesia Type: general with block    Vitals  Vitals Value Taken Time   /63 12/13/23 1750   Temp     Pulse 85 12/13/23 1755   Resp 12 12/13/23 1735   SpO2 96 % 12/13/23 1755   Vitals shown include unfiled device data.        Post Anesthesia Care and Evaluation    Patient location during evaluation: bedside  Patient participation: complete - patient participated  Level of consciousness: awake  Pain score: 3  Pain management: adequate    Airway patency: patent  PONV Status: none  Cardiovascular status: acceptable and stable  Respiratory status: acceptable  Hydration status: acceptable    Comments: An Anesthesiologist personally participated in the most demanding procedures (including induction and emergence if applicable) in the anesthesia plan, monitored the course of anesthesia administration at frequent intervals and remained physically present and available for immediate diagnosis and treatment of emergencies.

## 2023-12-13 NOTE — CONSULTS
[facility]   Hospitalist Consult Note  Date: 2023   Patient Name: Basim Austin  : 1970  MRN: [id number]  Primary Care Physician:  Ai Red APRN  Referring Physician: Ousmane Edwards MD  Date of admission: 2023    Subjective left knee pain  Subjective     Reason for Consult/ Chief Complaint: Left knee pain    HPI:  Basim Austin is a 53 y.o. male with left knee pain that is persistently worse with activity.  He is undergone total knee replacement.  We were asked to help with medical management.  Patient has hypertension.  After the procedure patient's temperature is 97.3, pulse was 75, respiratory rate is 12, blood pressure is 118/65, and he is on 2 L of oxygen saturating 93%.    Review of Systems   All systems were reviewed and negative except for: Knee pain    Personal History     Past Medical History:  Past Medical History:   Diagnosis Date    Arthritis 2012    Hypertension 2005    Knee swelling 2023       Past Surgical History:  Past Surgical History:   Procedure Laterality Date    COLONOSCOPY  ,,2018    Estimate    HERNIA REPAIR      Estimated        Family History:   Family History   Problem Relation Age of Onset    Cancer Mother         colon    Osteoporosis Mother     Heart disease Father     Hyperlipidemia Father     Osteoporosis Maternal Grandmother     Osteoporosis Sister        Social History:   Social History     Socioeconomic History    Marital status:    Tobacco Use    Smoking status: Former     Types: Cigars     Quit date:      Years since quittin.9     Passive exposure: Never    Smokeless tobacco: Never    Tobacco comments:     Stopped Smoking 2022   Vaping Use    Vaping Use: Some days    Substances: Nicotine, Flavoring   Substance and Sexual Activity    Alcohol use: Never    Drug use: Never    Sexual activity: Defer       Home Medications:  allopurinol, amLODIPine, losartan, nabumetone, and  sulindac    Allergies:  No Known Allergies    Review of Systems   All systems were reviewed and negative except for: Left knee pain    Objective    Objective     Vitals:   Temp:  [96.8 °F (36 °C)-97.3 °F (36.3 °C)] 97.3 °F (36.3 °C)  Heart Rate:  [63-90] 84  Resp:  [12-20] 12  BP: (107-153)/(58-88) 118/65  Flow (L/min):  [2] 2    Physical Exam:   Constitutional: Awake, alert, no acute distress   Eyes: Pupils equal, sclerae anicteric, no conjunctival injection   HENT: NCAT, mucous membranes moist   Neck: Supple, no thyromegaly, no lymphadenopathy, trachea midline   Respiratory: Clear to auscultation bilaterally, nonlabored respirations    Cardiovascular: RRR, no murmurs, rubs, or gallops, palpable pedal pulses bilaterally   Gastrointestinal: Positive bowel sounds, soft, nontender, nondistended   Musculoskeletal: No bilateral ankle edema, no clubbing or cyanosis to extremities   Psychiatric: Appropriate affect, cooperative   Neurologic: Oriented x 3, strength symmetric in all extremities, Cranial Nerves grossly intact to confrontation, speech clear   Skin: No rashes     Result Review    Result Review:  I have personally reviewed the results from the time of this admission to 12/13/2023 18:17 EST and agree with these findings:  [x]  Laboratory  []  Microbiology  [x]  Radiology  []  EKG/Telemetry   []  Cardiology/Vascular   []  Pathology  [x]  Old records  []  Other:    Assessment & Plan   Assessment / Plan   #1 osteoarthritis of the knee status post total knee replacement on the left side  -PT/OT/case management for discharge planning.  Pain management and anticoagulation per Ortho.    #2 hypertension resume home medications.    DVT prophylaxis:  Medical and mechanical DVT prophylaxis orders are present.    CODE STATUS:     Full code    Electronically signed by Fernando Leos DO, 12/13/23, 6:17 PM EST.

## 2023-12-13 NOTE — ANESTHESIA PREPROCEDURE EVALUATION
Anesthesia Evaluation     Patient summary reviewed and Nursing notes reviewed   no history of anesthetic complications:   NPO Solid Status: > 8 hours  NPO Liquid Status: > 2 hours           Airway   Mallampati: II  TM distance: >3 FB  Neck ROM: full  No difficulty expected  Dental      Pulmonary - negative pulmonary ROS and normal exam    breath sounds clear to auscultation  Cardiovascular - normal exam  Exercise tolerance: good (4-7 METS)    ECG reviewed  Rhythm: regular  Rate: normal    (+) hypertension      Neuro/Psych- negative ROS  GI/Hepatic/Renal/Endo - negative ROS     Musculoskeletal     Abdominal    Substance History - negative use     OB/GYN negative ob/gyn ROS         Other   arthritis,     ROS/Med Hx Other: >4METS, HX HTN, ARTHRITIS. TJR. KT               Anesthesia Plan    ASA 2     general with block     (Patient understands anesthesia not responsible for dental damage.)  intravenous induction     Anesthetic plan, risks, benefits, and alternatives have been provided, discussed and informed consent has been obtained with: patient.    Use of blood products discussed with patient .    Plan discussed with CRNA.    CODE STATUS:

## 2023-12-13 NOTE — PLAN OF CARE
Problem: Adult Inpatient Plan of Care  Goal: Plan of Care Review  Outcome: Ongoing, Progressing  Goal: Patient-Specific Goal (Individualized)  Outcome: Ongoing, Progressing  Goal: Absence of Hospital-Acquired Illness or Injury  Outcome: Ongoing, Progressing  Intervention: Identify and Manage Fall Risk  Recent Flowsheet Documentation  Taken 12/13/2023 1812 by Ai Higgins RN  Safety Promotion/Fall Prevention:   safety round/check completed   room organization consistent   nonskid shoes/slippers when out of bed   lighting adjusted   fall prevention program maintained   clutter free environment maintained   assistive device/personal items within reach  Intervention: Prevent and Manage VTE (Venous Thromboembolism) Risk  Recent Flowsheet Documentation  Taken 12/13/2023 1812 by Ai Higgins RN  Activity Management: up in chair  Intervention: Prevent Infection  Recent Flowsheet Documentation  Taken 12/13/2023 1812 by Ai Higgins RN  Infection Prevention:   cohorting utilized   environmental surveillance performed   equipment surfaces disinfected   hand hygiene promoted   rest/sleep promoted   personal protective equipment utilized   single patient room provided   visitors restricted/screened  Goal: Optimal Comfort and Wellbeing  Outcome: Ongoing, Progressing  Intervention: Monitor Pain and Promote Comfort  Recent Flowsheet Documentation  Taken 12/13/2023 1815 by Ai Higgins RN  Pain Management Interventions:   see MAR   cold applied  Goal: Readiness for Transition of Care  Outcome: Ongoing, Progressing  Intervention: Mutually Develop Transition Plan  Recent Flowsheet Documentation  Taken 12/13/2023 1812 by Ai Higgins RN  Transportation Anticipated:   car, drives self   family or friend will provide  Patient/Family Anticipated Services at Transition: none  Patient/Family Anticipates Transition to:   home   home with family     Problem: Pain Acute  Goal: Acceptable Pain Control and Functional  Ability  Outcome: Ongoing, Progressing  Intervention: Prevent or Manage Pain  Recent Flowsheet Documentation  Taken 12/13/2023 1812 by Ai Higgins RN  Medication Review/Management: medications reviewed  Intervention: Develop Pain Management Plan  Recent Flowsheet Documentation  Taken 12/13/2023 1815 by Ai Higgins RN  Pain Management Interventions:   see MAR   cold applied     Problem: Adjustment to Surgery (Knee Arthroplasty)  Goal: Optimal Coping  Outcome: Ongoing, Progressing     Problem: Bleeding (Knee Arthroplasty)  Goal: Absence of Bleeding  Outcome: Ongoing, Progressing  Intervention: Monitor and Manage Bleeding  Recent Flowsheet Documentation  Taken 12/13/2023 1812 by Ai Higgins RN  Bleeding Management: dressing monitored     Problem: Bowel Motility Impaired (Knee Arthroplasty)  Goal: Effective Bowel Elimination  Outcome: Ongoing, Progressing     Problem: Fluid and Electrolyte Imbalance (Knee Arthroplasty)  Goal: Fluid and Electrolyte Balance  Outcome: Ongoing, Progressing     Problem: Functional Ability Impaired (Knee Arthroplasty)  Goal: Optimal Functional Ability  Outcome: Ongoing, Progressing  Intervention: Promote Optimal Functional Status  Recent Flowsheet Documentation  Taken 12/13/2023 1812 by Ai Higgins RN  Activity Management: up in chair  Assistive Device Utilized:   gait belt   walker     Problem: Infection (Knee Arthroplasty)  Goal: Absence of Infection Signs and Symptoms  Outcome: Ongoing, Progressing  Intervention: Prevent or Manage Infection  Recent Flowsheet Documentation  Taken 12/13/2023 1812 by Ai Higgins RN  Infection Prevention:   cohorting utilized   environmental surveillance performed   equipment surfaces disinfected   hand hygiene promoted   rest/sleep promoted   personal protective equipment utilized   single patient room provided   visitors restricted/screened     Problem: Neurovascular Compromise (Knee Arthroplasty)  Goal: Intact Neurovascular  Status  Outcome: Ongoing, Progressing     Problem: Ongoing Anesthesia Effects (Knee Arthroplasty)  Goal: Anesthesia/Sedation Recovery  Outcome: Ongoing, Progressing  Intervention: Optimize Anesthesia Recovery  Recent Flowsheet Documentation  Taken 12/13/2023 1812 by Ai Higgins RN  Safety Promotion/Fall Prevention:   safety round/check completed   room organization consistent   nonskid shoes/slippers when out of bed   lighting adjusted   fall prevention program maintained   clutter free environment maintained   assistive device/personal items within reach  Taken 12/13/2023 1800 by Ai Higgins RN  Patient Tolerance (IS):   good   no adverse signs/symptoms present  Administration (IS):   mouthpiece utilized   self-administered  Level Incentive Spirometer (mL): 3000  Incentive Spirometer Predicted Level (mL): 2000  Number of Repetitions (IS): 5     Problem: Pain (Knee Arthroplasty)  Goal: Acceptable Pain Control  Outcome: Ongoing, Progressing  Intervention: Prevent or Manage Pain  Recent Flowsheet Documentation  Taken 12/13/2023 1815 by Ai Higgins RN  Pain Management Interventions:   see MAR   cold applied     Problem: Postoperative Nausea and Vomiting (Knee Arthroplasty)  Goal: Nausea and Vomiting Relief  Outcome: Ongoing, Progressing     Problem: Postoperative Urinary Retention (Knee Arthroplasty)  Goal: Effective Urinary Elimination  Outcome: Ongoing, Progressing     Problem: Respiratory Compromise (Knee Arthroplasty)  Goal: Effective Oxygenation and Ventilation  Outcome: Ongoing, Progressing  Intervention: Optimize Oxygenation and Ventilation  Recent Flowsheet Documentation  Taken 12/13/2023 1800 by Ai Higgins RN  Patient Tolerance (IS):   good   no adverse signs/symptoms present  Administration (IS):   mouthpiece utilized   self-administered  Level Incentive Spirometer (mL): 3000  Incentive Spirometer Predicted Level (mL): 2000  Number of Repetitions (IS): 5   Goal Outcome Evaluation:       Pt's vital signs have been stable since coming from surgery. Pt was able to walk from the stretcher to the chair in his room. Pt's pain has been controlled today with prn pain medication.

## 2023-12-14 VITALS
TEMPERATURE: 98.2 F | HEART RATE: 68 BPM | RESPIRATION RATE: 20 BRPM | BODY MASS INDEX: 40.34 KG/M2 | WEIGHT: 288.14 LBS | HEIGHT: 71 IN | DIASTOLIC BLOOD PRESSURE: 74 MMHG | SYSTOLIC BLOOD PRESSURE: 125 MMHG | OXYGEN SATURATION: 95 %

## 2023-12-14 LAB
HCT VFR BLD AUTO: 38.9 % (ref 37.5–51)
HGB BLD-MCNC: 12.8 G/DL (ref 13–17.7)

## 2023-12-14 PROCEDURE — 97150 GROUP THERAPEUTIC PROCEDURES: CPT

## 2023-12-14 PROCEDURE — 85014 HEMATOCRIT: CPT | Performed by: ORTHOPAEDIC SURGERY

## 2023-12-14 PROCEDURE — 97165 OT EVAL LOW COMPLEX 30 MIN: CPT

## 2023-12-14 PROCEDURE — 97116 GAIT TRAINING THERAPY: CPT

## 2023-12-14 PROCEDURE — 99239 HOSP IP/OBS DSCHRG MGMT >30: CPT | Performed by: HOSPITALIST

## 2023-12-14 PROCEDURE — 85018 HEMOGLOBIN: CPT | Performed by: ORTHOPAEDIC SURGERY

## 2023-12-14 PROCEDURE — 97161 PT EVAL LOW COMPLEX 20 MIN: CPT

## 2023-12-14 PROCEDURE — 97535 SELF CARE MNGMENT TRAINING: CPT

## 2023-12-14 PROCEDURE — 97530 THERAPEUTIC ACTIVITIES: CPT

## 2023-12-14 PROCEDURE — 25010000002 KETOROLAC TROMETHAMINE PER 15 MG: Performed by: ORTHOPAEDIC SURGERY

## 2023-12-14 PROCEDURE — 25010000002 CEFAZOLIN PER 500 MG: Performed by: ORTHOPAEDIC SURGERY

## 2023-12-14 RX ORDER — ASPIRIN 325 MG
325 TABLET, DELAYED RELEASE (ENTERIC COATED) ORAL DAILY
Qty: 14 TABLET | Refills: 0 | Status: SHIPPED | OUTPATIENT
Start: 2023-12-29

## 2023-12-14 RX ORDER — HYDROCODONE BITARTRATE AND ACETAMINOPHEN 7.5; 325 MG/1; MG/1
1-2 TABLET ORAL EVERY 4 HOURS PRN
Qty: 40 TABLET | Refills: 0 | Status: SHIPPED | OUTPATIENT
Start: 2023-12-14 | End: 2023-12-18 | Stop reason: SDUPTHER

## 2023-12-14 RX ORDER — NALOXONE HYDROCHLORIDE 4 MG/.1ML
SPRAY NASAL
Qty: 2 EACH | Refills: 0 | Status: SHIPPED | OUTPATIENT
Start: 2023-12-14

## 2023-12-14 RX ADMIN — LOSARTAN POTASSIUM 100 MG: 50 TABLET, FILM COATED ORAL at 08:08

## 2023-12-14 RX ADMIN — AMLODIPINE BESYLATE 10 MG: 5 TABLET ORAL at 08:08

## 2023-12-14 RX ADMIN — ALLOPURINOL 100 MG: 100 TABLET ORAL at 08:08

## 2023-12-14 RX ADMIN — ACETAMINOPHEN 1000 MG: 500 TABLET ORAL at 11:35

## 2023-12-14 RX ADMIN — HYDROCODONE BITARTRATE AND ACETAMINOPHEN 2 TABLET: 7.5; 325 TABLET ORAL at 08:08

## 2023-12-14 RX ADMIN — KETOROLAC TROMETHAMINE 15 MG: 30 INJECTION, SOLUTION INTRAMUSCULAR; INTRAVENOUS at 05:52

## 2023-12-14 RX ADMIN — KETOROLAC TROMETHAMINE 15 MG: 30 INJECTION, SOLUTION INTRAMUSCULAR; INTRAVENOUS at 11:35

## 2023-12-14 RX ADMIN — APIXABAN 2.5 MG: 2.5 TABLET, FILM COATED ORAL at 08:08

## 2023-12-14 RX ADMIN — HYDROCODONE BITARTRATE AND ACETAMINOPHEN 1 TABLET: 7.5; 325 TABLET ORAL at 03:45

## 2023-12-14 RX ADMIN — CEFAZOLIN SODIUM 3 G: 10 INJECTION, POWDER, FOR SOLUTION INTRAVENOUS at 05:53

## 2023-12-14 NOTE — PLAN OF CARE
Goal Outcome Evaluation:  Plan of Care Reviewed With: patient        Progress: no change  Outcome Evaluation: Patient presents with limitations affecting strength, activity tolerance, and balance impacting patient's ability to return home safely and independently.  The skills of a therapist will be required to safely and effectively implement the following treatment plan to restore maximal level of function      Anticipated Discharge Disposition (OT): home with outpatient therapy services

## 2023-12-14 NOTE — THERAPY TREATMENT NOTE
Acute Care - Physical Therapy Treatment Note   [place]     Patient Name: Basim Austin  : 1970  MRN: [id number]  Today's Date: 2023      Visit Dx:     ICD-10-CM ICD-9-CM   1. Primary osteoarthritis of left knee  M17.12 715.16   2. Difficulty in walking  R26.2 719.7     Patient Active Problem List   Diagnosis    Class 2 severe obesity with serious comorbidity and body mass index (BMI) of 39.0 to 39.9 in adult    Primary hypertension    Primary osteoarthritis of left knee    Osteoarthritis of left knee     Past Medical History:   Diagnosis Date    Arthritis     Hypertension 2005    Knee swelling 2023     Past Surgical History:   Procedure Laterality Date    COLONOSCOPY  ,,    Estimate    HERNIA REPAIR      Estimated     TOTAL KNEE ARTHROPLASTY Left 2023    Procedure: LEFT TOTAL KNEE ARTHROPLASTY WITH LUCÍA;  Surgeon: Ousmane Edwards MD;  Location: [facility] MAIN OR;  Service: Robotics - Ortho;  Laterality: Left;     PT Assessment (last 12 hours)       PT Evaluation and Treatment       Row Name 23 1453 23 1300       Physical Therapy Time and Intention    Subjective Information complains of;pain  -RH no complaints  -LUH    Document Type therapy note (daily note)  -RH evaluation  -LUH    Mode of Treatment individual therapy;group therapy;physical therapy  -RH individual therapy;physical therapy  -LUH    Patient Effort good  -RH good  -LUH    Symptoms Noted During/After Treatment -- none  -LUH    Comment Gait performed individually; therapuetic exercises performed in a group session with * participants  - --      Row Name 23 1453 23 1300       General Information    Patient Observations alert;cooperative;agree to therapy  - alert;cooperative;agree to therapy  -LUH    Prior Level of Function -- independent:;all household mobility;community mobility  -LUH    Equipment Currently Used at Home -- none  -LUH    Existing Precautions/Restrictions --  fall;weight bearing  -LUH    Barriers to Rehab -- none identified  -LUH      [facility] Name 12/14/23 1300          Living Environment    Current Living Arrangements home  -LUH     People in Home spouse  -[facility] Name 12/14/23 1300          Home Use of Assistive/Adaptive Equipment    Equipment Currently Used at Home commode;walker, rolling  -LUH       [facility] Name 12/14/23 1453          Pain    Additional Documentation Pain Scale: FACES Pre/Post-Treatment (Group)  -[facility] Name 12/14/23 1453          Pain Scale: FACES Pre/Post-Treatment    Pain: FACES Scale, Pretreatment 0-->no hurt  -RH     Posttreatment Pain Rating 2-->hurts little bit  -     Pain Location - Side/Orientation Left  -     Pain Location - knee  -[facility] Name 12/14/23 1300          Cognition    Orientation Status (Cognition) oriented x 3  -[facility] Name 12/14/23 1453          Range of Motion (ROM)    Range of Motion --  Pt L knee AAROM at 90 degrees flex and 8 degrees ext.  -[facility] Name 12/14/23 1300          Range of Motion Comprehensive    General Range of Motion lower extremity range of motion deficits identified  5-90° knee LLE  -[facility] Name 12/14/23 1453          Strength (Manual Muscle Testing)    Strength (Manual Muscle Testing) --  Pt R knee ext strength at 3-/5.  -[facility] Name 12/14/23 1300          Strength Comprehensive (MMT)    General Manual Muscle Testing (MMT) Assessment lower extremity strength deficits identified  LLE 3-/5  [facility]          Mobility    Extremity Weight-bearing Status left lower extremity  -     Left Lower Extremity (Weight-bearing Status) weight-bearing as tolerated (WBAT)  -[facility] Name 12/14/23 1300          Bed Mobility    Bed Mobility bed mobility (all) activities;supine-sit  -LUH     All Activities, [place] (Bed Mobility) standby assist  -LUH     Supine-Sit [place] (Bed Mobility) standby assist  -LUH       Row Name 12/14/23 1453 12/14/23 1300        Transfers    Transfers sit-stand transfer;stand-sit transfer  -RH bed-chair transfer;sit-stand transfer  -LUH      Row Name 12/14/23 1300          Bed-Chair Transfer    Bed-Chair [place] (Transfers) contact guard  -LUH     Assistive Device (Bed-Chair Transfers) walker, front-wheeled  -LUH       Row Name 12/14/23 1453 12/14/23 1300       Sit-Stand Transfer    Sit-Stand [place] (Transfers) contact guard  -RH contact guard  -LUH    Assistive Device (Sit-Stand Transfers) walker, front-wheeled  -RH walker, front-wheeled  -LUH      Row Name 12/14/23 1453          Stand-Sit Transfer    Stand-Sit [place] (Transfers) contact guard  -RH     Assistive Device (Stand-Sit Transfers) walker, front-wheeled  -RH       Row Name 12/14/23 1453 12/14/23 1300       Gait/Stairs (Locomotion)    Gait/Stairs Locomotion gait/ambulation assistive device  -RH gait/ambulation assistive device  -LUH    [place] Level (Gait) contact guard  -RH contact guard  -LUH    Assistive Device (Gait) walker, front-wheeled  -RH walker, front-wheeled  -LUH    Patient was able to Ambulate yes  -RH --    Distance in Feet (Gait) 100  -  -LUH    Pattern (Gait) --  Pt able to achieve and maintain reciprocal gait.  -RH --    Deviations/Abnormal Patterns (Gait) gait speed decreased;stride length decreased;antalgic  -RH --    Left Sided Gait Deviations heel strike decreased  -RH --    Negotiation (Stairs) stairs independence;handrail location;number of steps;ascending technique;descending technique  -RH --    [place] Level (Stairs) contact guard  -RH --    Handrail Location (Stairs) both sides  -RH --    Number of Steps (Stairs) 5  -RH --    Ascending Technique (Stairs) step-to-step  -RH --    Descending Technique (Stairs) step-to-step  -RH --      Row Name 12/14/23 1453 12/14/23 1300       Safety Issues, Functional Mobility    Impairments Affecting Function (Mobility) balance;pain;range of motion (ROM);strength  -RH balance;pain;range of motion  (ROM);strength  -LUH      Row Name 12/14/23 1453 12/14/23 1300       Balance    Balance Assessment standing dynamic balance  - standing dynamic balance  -LUH    Dynamic Standing Balance contact guard  - contact guard  -LUH    Position/Device Used, Standing Balance walker, front-wheeled  -RH walker, front-wheeled  -LUH      Row Name 12/14/23 1453          Motor Skills    Therapeutic Exercise hip;knee;ankle  -       Row Name 12/14/23 1453          Hip (Therapeutic Exercise)    Hip (Therapeutic Exercise) isometric exercises  -     Hip Isometrics (Therapeutic Exercise) left;gluteal sets;10 repetitions;2 sets  -       Row Name 12/14/23 1453          Knee (Therapeutic Exercise)    Knee (Therapeutic Exercise) strengthening exercise;isometric exercises  -     Knee Isometrics (Therapeutic Exercise) left;quad sets;10 repetitions;2 sets  -     Knee Strengthening (Therapeutic Exercise) left;heel slides;SLR (straight leg raise);SAQ (short arc quad);LAQ (long arc quad);10 repetitions;2 sets  -       Row Name 12/14/23 1453          Ankle (Therapeutic Exercise)    Ankle (Therapeutic Exercise) AROM (active range of motion)  -     Ankle AROM (Therapeutic Exercise) left;dorsiflexion;plantarflexion;10 repetitions;2 sets  -       Row Name             Wound 12/13/23 1500 Left anterior knee Incision    Wound - Properties Group Placement Date: 12/13/23  -LB Placement Time: 1500  -LB Present on Original Admission: N  -LB Side: Left  -LB Orientation: anterior  -LB Location: knee  -LB Primary Wound Type: Incision  -LB, left total knee arthroplasty with nallely- left     Retired Wound - Properties Group Placement Date: 12/13/23  -LB Placement Time: 1500  -LB Present on Original Admission: N  -LB Side: Left  -LB Orientation: anterior  -LB Location: knee  -LB Primary Wound Type: Incision  -LB, left total knee arthroplasty with nallely- left     Retired Wound - Properties Group Date first assessed: 12/13/23  -LB Time first assessed:  1500  -LB Present on Original Admission: N  -LB Side: Left  -LB Location: knee  -LB Primary Wound Type: Incision  -LB, left total knee arthroplasty with nallely- left       Row Name 12/14/23 1300          Plan of Care Review    Plan of Care Reviewed With patient  -LUH     Outcome Evaluation Pt presents with decreased ROM, strength, transfers and ambulation.  Skilled PT services will be required to address these mobility deficits.  -LUH       Row Name 12/14/23 1453          Positioning and Restraints    Post Treatment Position chair  -     In Chair exit alarm on;encouraged to call for assist;call light within reach;reclined  -       Row Name 12/14/23 1300          Therapy Assessment/Plan (PT)    Patient/Family Therapy Goals Statement (PT) Pt wants to walk without pain  -LUH     Rehab Potential (PT) good, to achieve stated therapy goals  -LUH     Criteria for Skilled Interventions Met (PT) skilled treatment is necessary  -LUH     Therapy Frequency (PT) 2 times/day  -LUH     Predicted Duration of Therapy Intervention (PT) 10 days  -LUH     Problem List (PT) problems related to;balance;mobility;range of motion (ROM);strength;pain  -LUH     Activity Limitations Related to Problem List (PT) unable to ambulate safely;unable to transfer safely  -LUH       Row Name 12/14/23 1300          PT Evaluation Complexity    History, PT Evaluation Complexity no personal factors and/or comorbidities  -LUH     Examination of Body Systems (PT Eval Complexity) total of 4 or more elements  -LUH     Clinical Presentation (PT Evaluation Complexity) stable  -LUH     Clinical Decision Making (PT Evaluation Complexity) low complexity  -LUH     Overall Complexity (PT Evaluation Complexity) low complexity  -LUH       Row Name 12/14/23 1453          Progress Summary (PT)    Progress Toward Functional Goals (PT) progress toward functional goals is good  -     Daily Progress Summary (PT) Pt is progressing well with their exercise program.  Will continue  current plan of care.  -TOMMIE       Row Name 12/14/23 1300          Therapy Plan Review/Discharge Plan (PT)    Therapy Plan Review (PT) evaluation/treatment results reviewed;participants voiced agreement with care plan;participants included;patient  -LUH       Row Name 12/14/23 1300          Physical Therapy Goals    Transfer Goal Selection (PT) transfer, PT goal 1  -LUH     Gait Training Goal Selection (PT) gait training, PT goal 1  -LUH     ROM Goal Selection (PT) ROM, PT goal 1  -LUH     Strength Goal Selection (PT) strength, PT goal 1  -LUH       Row Name 12/14/23 1300          Transfer Goal 1 (PT)    Activity/Assistive Device (Transfer Goal 1, PT) transfers, all  -LUH     [place] Level/Cues Needed (Transfer Goal 1, PT) independent  -LUH     Time Frame (Transfer Goal 1, PT) long term goal (LTG);10 days  -LUH       Row Name 12/14/23 1300          Gait Training Goal 1 (PT)    Activity/Assistive Device (Gait Training Goal 1, PT) gait (walking locomotion);walker, rolling  -LUH     [place] Level (Gait Training Goal 1, PT) independent  -LUH     Distance (Gait Training Goal 1, PT) 300  -LUH     Time Frame (Gait Training Goal 1, PT) long term goal (LTG);10 days  -LUH       Row Name 12/14/23 1300          ROM Goal 1 (PT)    ROM Goal 1 (PT) Pt will demonstrate knee ROM from 0-110° on the affected side.  -LUH     Time Frame (ROM Goal 1, PT) long-term goal (LTG);10 days  -LUH       Row Name 12/14/23 1300          Strength Goal 1 (PT)    Strength Goal 1 (PT) Pt will demonstrate knee extension strength of 5/5 on the affected side.  -LUH     Time Frame (Strength Goal 1, PT) long term goal (LTG);10 days  -LUH               User Key  (r) = Recorded By, (t) = Taken By, (c) = Cosigned By      Initials Name Provider Type    Luis Greer, RN Registered Nurse    Adonay Boo, PTA Physical Therapist Assistant    Zachariah Cuadra, PT Physical Therapist                    Physical Therapy Education       Title: PT OT SLP Therapies  (Resolved)       Topic: Physical Therapy (Resolved)       Point: Mobility training (Resolved)       Learning Progress Summary             Patient Acceptance, E,TB, VU by [facility] at 12/14/2023 1305                         Point: Precautions (Resolved)       Learning Progress Summary             Patient Acceptance, E,TB, VU by [facility] at 12/14/2023 1305                                         User Key       Initials Effective Dates Name Provider Type Discipline    [facility] 06/03/21 -  Zachariah Guillermo, PT Physical Therapist PT                  PT Recommendation and Plan     Progress Summary (PT)  Progress Toward Functional Goals (PT): progress toward functional goals is good  Daily Progress Summary (PT): Pt is progressing well with their exercise program.  Will continue current plan of care.   Outcome Measures       Row Name 12/14/23 1400 12/14/23 1300          How much help from another person do you currently need...    Turning from your back to your side while in flat bed without using bedrails? 4  -RH 4  -LUH     Moving from lying on back to sitting on the side of a flat bed without bedrails? 4  -RH 4  -LUH     Moving to and from a bed to a chair (including a wheelchair)? 3  -RH 3  -LUH     Standing up from a chair using your arms (e.g., wheelchair, bedside chair)? 4  -RH 3  -LUH     Climbing 3-5 steps with a railing? 4  -RH 3  -LUH     To walk in hospital room? 4  -RH 3  -LUH     AM-PAC 6 Clicks Score (PT) 23  -RH 20  -LUH     Highest Level of Mobility Goal 7 --> Walk 25 feet or more  -RH 6 --> Walk 10 steps or more  -LUH        Functional Assessment    Outcome Measure Options -- AM-PAC 6 Clicks Basic Mobility (PT)  -LUH               User Key  (r) = Recorded By, (t) = Taken By, (c) = Cosigned By      Initials Name Provider Type    Adonay Boo PTA Physical Therapist Assistant    Zachariah Cuadra, PT Physical Therapist                     Time Calculation:    PT Charges       Row Name 12/14/23 1452 12/14/23 1301          Time  Calculation    PT Received On 12/14/23  -RH 12/14/23  -LUH     PT Goal Re-Cert Due Date -- 12/23/23  -LUH        Timed Charges    02760 - Gait Training Minutes  9  -RH --     82528 - PT Therapeutic Activity Minutes 4  -RH --        Untimed Charges    PT Eval/Re-eval Minutes -- 20  -LUH     PT Group Therapy Minutes 35  -RH --        Total Minutes    Timed Charges Total Minutes 13  -RH --     Untimed Charges Total Minutes 35  -RH 20  -LUH      Total Minutes 48  -RH 20  -LUH               User Key  (r) = Recorded By, (t) = Taken By, (c) = Cosigned By      Initials Name Provider Type     Adonay Pineda PTA Physical Therapist Assistant    Zachariah Cuadra, PT Physical Therapist                  Therapy Charges for Today       Code Description Service Date Service Provider Modifiers Qty    56386641432 HC GAIT TRAINING EA 15 MIN 12/14/2023 Adonay Pineda PTA GP 1    46348625040 HC PT THER PROC GROUP 12/14/2023 Adonay Pineda PTA GP 1            PT G-Codes  Outcome Measure Options: AM-PAC 6 Clicks Basic Mobility (PT)  AM-PAC 6 Clicks Score (PT): 23  AM-PAC 6 Clicks Score (OT): 20    Adonay Pineda PTA  12/14/2023

## 2023-12-14 NOTE — THERAPY EVALUATION
Acute Care - Physical Therapy Initial Evaluation   [place]     Patient Name: Basim Austin  : 1970  MRN: [id number]  Today's Date: 2023     Admit date: 2023     Referring Physician: Fernando Leos DO     Surgery Date:2023   Procedure(s) (LRB):  LEFT TOTAL KNEE ARTHROPLASTY WITH LUCÍA (Left)          Visit Dx:     ICD-10-CM ICD-9-CM   1. Primary osteoarthritis of left knee  M17.12 715.16   2. Difficulty in walking  R26.2 719.7     Patient Active Problem List   Diagnosis    Class 2 severe obesity with serious comorbidity and body mass index (BMI) of 39.0 to 39.9 in adult    Primary hypertension    Primary osteoarthritis of left knee    Osteoarthritis of left knee     Past Medical History:   Diagnosis Date    Arthritis     Hypertension 2005    Knee swelling 2023     Past Surgical History:   Procedure Laterality Date    COLONOSCOPY  ,,    Estimate    HERNIA REPAIR      Estimated     TOTAL KNEE ARTHROPLASTY Left 2023    Procedure: LEFT TOTAL KNEE ARTHROPLASTY WITH LUCÍA;  Surgeon: Ousmane Edwards MD;  Location: [facility];  Service: [facility] - Ortho;  Laterality: Left;     PT Assessment (last 12 hours)       PT Evaluation and Treatment       Row Name 23 1300          Physical Therapy Time and Intention    Subjective Information no complaints  -[facility]H     Document Type evaluation  -LUH     Mode of Treatment individual therapy;physical therapy  -LUH     Patient Effort good  -LUH     Symptoms Noted During/After Treatment none  -LUH       Row Name 23 1300          General Information    Patient Observations alert;cooperative;agree to therapy  -LUH     Prior Level of Function independent:;all household mobility;community mobility  -LUH     Equipment Currently Used at Home none  -LUH     Existing Precautions/Restrictions fall;weight bearing  -LUH     Barriers to Rehab none identified  -LUH       Row Name 23 1300          Living Environment    Current  Living Arrangements home  -LUH     People in Home spouse  -LUH       Row Name 12/14/23 1300          Home Use of Assistive/Adaptive Equipment    Equipment Currently Used at Home commode;walker, rolling  -LUH       Row Name 12/14/23 1300          Cognition    Orientation Status (Cognition) oriented x 3  -LUH       Row Name 12/14/23 1300          Range of Motion Comprehensive    General Range of Motion lower extremity range of motion deficits identified  5-90° knee LLE  -LUH       Row Name 12/14/23 1300          Strength Comprehensive (MMT)    General Manual Muscle Testing (MMT) Assessment lower extremity strength deficits identified  LLE 3-/5  -LUH       Row Name 12/14/23 1300          Bed Mobility    Bed Mobility bed mobility (all) activities;supine-sit  -LUH     All Activities, Pitman (Bed Mobility) standby assist  -LUH     Supine-Sit Pitman (Bed Mobility) standby assist  -LUH       Row Name 12/14/23 1300          Transfers    Transfers bed-chair transfer;sit-stand transfer  -LUH       Row Name 12/14/23 1300          Bed-Chair Transfer    Bed-Chair [place]man (Transfers) contact guard  -LUH     Assistive Device (Bed-Chair Transfers) walker, front-wheeled  -LUH       Row Name 12/14/23 1300          Sit-Stand Transfer    Sit-Stand Pitman (Transfers) contact guard  -LUH     Assistive Device (Sit-Stand Transfers) walker, front-wheeled  -LUH       Row Name 12/14/23 1300          Gait/Stairs (Locomotion)    Gait/Stairs Locomotion gait/ambulation assistive device  -LUH     Pitman Level (Gait) contact guard  -LUH     Assistive Device (Gait) walker, front-wheeled  -LUH     Distance in Feet (Gait) 100  -LUH       Row Name 12/14/23 1300          Safety Issues, Functional Mobility    Impairments Affecting Function (Mobility) balance;pain;range of motion (ROM);strength  -LUH       Row Name 12/14/23 1300          Balance    Balance Assessment standing dynamic balance  -LUH     Dynamic Standing Balance contact guard  -LUH      Position/Device Used, Standing Balance walker, front-wheeled  -LUH       Row Name             Wound 12/13/23 1500 Left anterior knee Incision    Wound - Properties Group Placement Date: 12/13/23  -LB Placement Time: 1500  -LB Present on Original Admission: N  -LB Side: Left  -LB Orientation: anterior  -LB Location: knee  -LB Primary Wound Type: Incision  -LB, left total knee arthroplasty with nallely- left     Retired Wound - Properties Group Placement Date: 12/13/23  -LB Placement Time: 1500  -LB Present on Original Admission: N  -LB Side: Left  -LB Orientation: anterior  -LB Location: knee  -LB Primary Wound Type: Incision  -LB, left total knee arthroplasty with nallely- left     Retired Wound - Properties Group Date first assessed: 12/13/23  -LB Time first assessed: 1500  -LB Present on Original Admission: N  -LB Side: Left  -LB Location: knee  -LB Primary Wound Type: Incision  -LB, left total knee arthroplasty with nallely- left       Row Name 12/14/23 1300          Plan of Care Review    Plan of Care Reviewed With patient  -LUH     Outcome Evaluation Pt presents with decreased ROM, strength, transfers and ambulation.  Skilled PT services will be required to address these mobility deficits.  -LUH       Row Name 12/14/23 1300          Therapy Assessment/Plan (PT)    Patient/Family Therapy Goals Statement (PT) Pt wants to walk without pain  -LUH     Rehab Potential (PT) good, to achieve stated therapy goals  -LUH     Criteria for Skilled Interventions Met (PT) skilled treatment is necessary  -LUH     Therapy Frequency (PT) 2 times/day  -LUH     Predicted Duration of Therapy Intervention (PT) 10 days  -LUH     Problem List (PT) problems related to;balance;mobility;range of motion (ROM);strength;pain  -LUH     Activity Limitations Related to Problem List (PT) unable to ambulate safely;unable to transfer safely  -LUH       Row Name 12/14/23 1300          PT Evaluation Complexity    History, PT Evaluation Complexity no personal  factors and/or comorbidities  -LUH     Examination of Body Systems (PT Eval Complexity) total of 4 or more elements  -LUH     Clinical Presentation (PT Evaluation Complexity) stable  -LUH     Clinical Decision Making (PT Evaluation Complexity) low complexity  -LUH     Overall Complexity (PT Evaluation Complexity) low complexity  -LUH       Row Name 12/14/23 1300          Therapy Plan Review/Discharge Plan (PT)    Therapy Plan Review (PT) evaluation/treatment results reviewed;participants voiced agreement with care plan;participants included;patient  -LUH       Row Name 12/14/23 1300          Physical Therapy Goals    Transfer Goal Selection (PT) transfer, PT goal 1  -LUH     Gait Training Goal Selection (PT) gait training, PT goal 1  -LUH     ROM Goal Selection (PT) ROM, PT goal 1  -LUH     Strength Goal Selection (PT) strength, PT goal 1  -LUH       Row Name 12/14/23 1300          Transfer Goal 1 (PT)    Activity/Assistive Device (Transfer Goal 1, PT) transfers, all  -LUH     Rains Level/Cues Needed (Transfer Goal 1, PT) independent  -LUH     Time Frame (Transfer Goal 1, PT) long term goal (LTG);10 days  -LUH       Row Name 12/14/23 1300          Gait Training Goal 1 (PT)    Activity/Assistive Device (Gait Training Goal 1, PT) gait (walking locomotion);walker, rolling  -LUH     Rains Level (Gait Training Goal 1, PT) independent  -LUH     Distance (Gait Training Goal 1, PT) 300  -LUH     Time Frame (Gait Training Goal 1, PT) long term goal (LTG);10 days  -LUH       Row Name 12/14/23 1300          ROM Goal 1 (PT)    ROM Goal 1 (PT) Pt will demonstrate knee ROM from 0-110° on the affected side.  -LUH     Time Frame (ROM Goal 1, PT) long-term goal (LTG);10 days  -LUH       Row Name 12/14/23 1300          Strength Goal 1 (PT)    Strength Goal 1 (PT) Pt will demonstrate knee extension strength of 5/5 on the affected side.  -LUH     Time Frame (Strength Goal 1, PT) long term goal (LTG);10 days  -LUH               User Key  (r) =  Recorded By, (t) = Taken By, (c) = Cosigned By      Initials Name Provider Type    Luis Greer RN Registered Nurse    Zachariah Cuadra PT Physical Therapist                    Physical Therapy Education       Title: PT OT SLP Therapies (Done)       Topic: Physical Therapy (Done)       Point: Mobility training (Done)       Learning Progress Summary             Patient Acceptance, E,TB, VU by [facility] at 12/14/2023 1305                         Point: Precautions (Done)       Learning Progress Summary             Patient Acceptance, E,TB, VU by [facility] at 12/14/2023 1305                                         User Key       Initials Effective Dates Name Provider Type Discipline    [facility] 06/03/21 -  Zachariah Guillermo PT Physical Therapist PT                  PT Recommendation and Plan  Anticipated Discharge Disposition (PT): home with outpatient therapy services  Planned Therapy Interventions (PT): balance training, bed mobility training, gait training, home exercise program, stair training, ROM (range of motion), strengthening, stretching, transfer training  Therapy Frequency (PT): 2 times/day  Plan of Care Reviewed With: patient  Outcome Evaluation: Pt presents with decreased ROM, strength, transfers and ambulation.  Skilled PT services will be required to address these mobility deficits.   Outcome Measures       Row Name 12/14/23 1300             How much help from another person do you currently need...    Turning from your back to your side while in flat bed without using bedrails? 4  -LUH      Moving from lying on back to sitting on the side of a flat bed without bedrails? 4  -LUH      Moving to and from a bed to a chair (including a wheelchair)? 3  -LUH      Standing up from a chair using your arms (e.g., wheelchair, bedside chair)? 3  -LUH      Climbing 3-5 steps with a railing? 3  -LUH      To walk in hospital room? 3  -LUH      AM-PAC 6 Clicks Score (PT) 20  -LUH      Highest Level of Mobility Goal 6 --> Walk 10  steps or more  -LUH         Functional Assessment    Outcome Measure Options AM-PAC 6 Clicks Basic Mobility (PT)  -LUH                User Key  (r) = Recorded By, (t) = Taken By, (c) = Cosigned By      Initials Name Provider Type    Zachariah Cuadra PT Physical Therapist                     Time Calculation:    PT Charges       Row Name 12/14/23 1301             Time Calculation    PT Received On 12/14/23  -LUH      PT Goal Re-Cert Due Date 12/23/23  -LUH         Untimed Charges    PT Eval/Re-eval Minutes 20  -LUH         Total Minutes    Untimed Charges Total Minutes 20  -LU[facility]       Total Minutes 20  -[facility]                User Key  (r) = Recorded By, (t) = Taken By, (c) = Cosigned By      Initials Name Provider Type    Zachariah Cuadra PT Physical Therapist                  Therapy Charges for Today       Code Description Service Date Service Provider Modifiers Qty    [id number] HC PT EVAL LOW COMPLEXITY 2 12/14/2023 Zachariah Guillermo PT GP 1            PT G-Codes  Outcome Measure Options: AM-PAC 6 Clicks Basic Mobility (PT)  AM-PAC 6 Clicks Score (PT): 20  AM-PAC 6 Clicks Score (OT): 20    Zachariah Guillermo PT  12/14/2023

## 2023-12-14 NOTE — THERAPY TREATMENT NOTE
Acute Care - Physical Therapy Treatment Note   [place]     Patient Name: Basim Austin  : 1970  MRN: [id number]  Today's Date: 2023      Visit Dx:     ICD-10-CM ICD-9-CM   1. Primary osteoarthritis of left knee  M17.12 715.16   2. Difficulty in walking  R26.2 719.7     Patient Active Problem List   Diagnosis    Class 2 severe obesity with serious comorbidity and body mass index (BMI) of 39.0 to 39.9 in adult    Primary hypertension    Primary osteoarthritis of left knee    Osteoarthritis of left knee     Past Medical History:   Diagnosis Date    Arthritis     Hypertension 2005    Knee swelling 2023     Past Surgical History:   Procedure Laterality Date    COLONOSCOPY  ,,    Estimate    HERNIA REPAIR      Estimated     TOTAL KNEE ARTHROPLASTY Left 2023    Procedure: LEFT TOTAL KNEE ARTHROPLASTY WITH LUCÍA;  Surgeon: Ousmane Edwards MD;  Location: [facility] MAIN OR;  Service: Robotics - Ortho;  Laterality: Left;     PT Assessment (last 12 hours)       PT Evaluation and Treatment       Row Name 23 1542 23 1453       Physical Therapy Time and Intention    Subjective Information complains of;pain  -RH complains of;pain  -RH    Document Type therapy note (daily note)  -RH therapy note (daily note)  -RH    Mode of Treatment individual therapy;group therapy;physical therapy  -RH individual therapy;group therapy;physical therapy  -RH    Patient Effort good  -RH good  -RH    Comment Gait performed individually; therapuetic exercises performed in a group session with * participants  -RH Gait performed individually; therapuetic exercises performed in a group session with * participants  -RH      Row Name 23 1300          Physical Therapy Time and Intention    Subjective Information no complaints  -LUH     Document Type evaluation  -LUH     Mode of Treatment individual therapy;physical therapy  -LUH     Patient Effort good  -LUH     Symptoms Noted During/After  Treatment none  -LUH       Row Name 12/14/23 1542 12/14/23 1453       General Information    Patient Observations alert;cooperative;agree to therapy  -RH alert;cooperative;agree to therapy  -[facility] Name 12/14/23 1300          General Information    Patient Observations alert;cooperative;agree to therapy  -LUH     Prior Level of Function independent:;all household mobility;community mobility  -LUH     Equipment Currently Used at Home none  -LUH     Existing Precautions/Restrictions fall;weight bearing  -LUH     Barriers to Rehab none identified  -LUH       [facility] Name 12/14/23 1300          Living Environment    Current Living Arrangements home  -LUH     People in Home spouse  -LUH       [facility] Name 12/14/23 1300          Home Use of Assistive/Adaptive Equipment    Equipment Currently Used at Home commode;walker, rolling  -LUH       [facility] Name 12/14/23 1453          Pain    Additional Documentation Pain Scale: FACES Pre/Post-Treatment (Group)  -[facility] Name 12/14/23 1542 12/14/23 1453       Pain Scale: FACES Pre/Post-Treatment    Pain: FACES Scale, Pretreatment 0-->no hurt  -RH 0-->no hurt  -RH    Posttreatment Pain Rating 0-->no hurt  -RH 2-->hurts little bit  -RH    Pain Location - Side/Orientation -- Left  -RH    Pain Location - -- knee  -[facility] Name 12/14/23 1300          Cognition    Orientation Status (Cognition) oriented x 3  -LUH       [facility] Name 12/14/23 1453          Range of Motion (ROM)    Range of Motion --  Pt L knee AAROM at 90 degrees flex and 8 degrees ext.  -[facility] Name 12/14/23 1300          Range of Motion Comprehensive    General Range of Motion lower extremity range of motion deficits identified  5-90° knee LLE  -LUH       [facility] Name 12/14/23 1453          Strength (Manual Muscle Testing)    Strength (Manual Muscle Testing) --  Pt R knee ext strength at 3-/5.  -[facility] Name 12/14/23 1300          Strength Comprehensive (MMT)    General Manual Muscle Testing (MMT) Assessment lower extremity  strength deficits identified  LLE 3-/5  -LUH       Row Name 12/14/23 1542 12/14/23 1453       Mobility    Extremity Weight-bearing Status left lower extremity  -RH left lower extremity  -RH    Left Lower Extremity (Weight-bearing Status) weight-bearing as tolerated (WBAT)  -RH weight-bearing as tolerated (WBAT)  -RH      Row Name 12/14/23 1300          Bed Mobility    Bed Mobility bed mobility (all) activities;supine-sit  -LUH     All Activities, [place] (Bed Mobility) standby assist  -LUH     Supine-Sit [place] (Bed Mobility) standby assist  -LUH       Row Name 12/14/23 1542 12/14/23 1453       Transfers    Transfers sit-stand transfer;stand-sit transfer  -RH sit-stand transfer;stand-sit transfer  -RH      Row Name 12/14/23 1300          Transfers    Transfers bed-chair transfer;sit-stand transfer  -LUH       Row Name 12/14/23 1300          Bed-Chair Transfer    Bed-Chair [place] (Transfers) contact guard  -LUH     Assistive Device (Bed-Chair Transfers) walker, front-wheeled  -LUH       Row Name 12/14/23 1542 12/14/23 1453       Sit-Stand Transfer    Sit-Stand [place] (Transfers) contact guard  -RH contact guard  -RH    Assistive Device (Sit-Stand Transfers) walker, front-wheeled  -RH walker, front-wheeled  -RH      Row Name 12/14/23 1300          Sit-Stand Transfer    Sit-Stand [place] (Transfers) contact guard  -LUH     Assistive Device (Sit-Stand Transfers) walker, front-wheeled  -LUH       Row Name 12/14/23 1542 12/14/23 1453       Stand-Sit Transfer    Stand-Sit [place] (Transfers) contact guard  -RH contact guard  -RH    Assistive Device (Stand-Sit Transfers) walker, front-wheeled  -RH walker, front-wheeled  -RH      Row Name 12/14/23 1542 12/14/23 1453       Gait/Stairs (Locomotion)    Gait/Stairs Locomotion gait/ambulation independence;distance ambulated;gait pattern;gait deviations  -RH gait/ambulation assistive device  -RH    [place] Level (Gait) contact guard  -RH contact guard   -RH    Assistive Device (Gait) walker, front-wheeled  -RH walker, front-wheeled  -RH    Patient was able to Ambulate yes  -RH yes  -RH    Distance in Feet (Gait) 100  -  -RH    Pattern (Gait) --  Pt able to achieve and maintain reciprocal gait.  -RH --  Pt able to achieve and maintain reciprocal gait.  -RH    Deviations/Abnormal Patterns (Gait) gait speed decreased;stride length decreased  -RH gait speed decreased;stride length decreased;antalgic  -RH    Left Sided Gait Deviations -- heel strike decreased  -RH    Negotiation (Stairs) -- stairs independence;handrail location;number of steps;ascending technique;descending technique  -RH    [place] Level (Stairs) -- contact guard  -RH    Handrail Location (Stairs) -- both sides  -RH    Number of Steps (Stairs) -- 5  -RH    Ascending Technique (Stairs) -- step-to-step  -RH    Descending Technique (Stairs) -- step-to-step  -RH      Row Name 12/14/23 1300          Gait/Stairs (Locomotion)    Gait/Stairs Locomotion gait/ambulation assistive device  -LUH     [place] Level (Gait) contact guard  -LUH     Assistive Device (Gait) walker, front-wheeled  -LUH     Distance in Feet (Gait) 100  -LUH       Row Name 12/14/23 1542 12/14/23 1453       Safety Issues, Functional Mobility    Impairments Affecting Function (Mobility) balance;pain;range of motion (ROM);strength  -RH balance;pain;range of motion (ROM);strength  -RH      Row Name 12/14/23 1300          Safety Issues, Functional Mobility    Impairments Affecting Function (Mobility) balance;pain;range of motion (ROM);strength  -LUH       Row Name 12/14/23 1542 12/14/23 1453       Balance    Balance Assessment standing dynamic balance  -RH standing dynamic balance  -RH    Dynamic Standing Balance contact guard  -RH contact guard  -RH    Position/Device Used, Standing Balance walker, front-wheeled  -RH walker, front-wheeled  -RH      Row Name 12/14/23 1300          Balance    Balance Assessment standing dynamic balance   -LUH     Dynamic Standing Balance contact guard  -LUH     Position/Device Used, Standing Balance walker, front-wheeled  -LUH       Row Name 12/14/23 1542 12/14/23 1453       Motor Skills    Therapeutic Exercise hip;knee;ankle  -RH hip;knee;ankle  -RH      Row Name 12/14/23 1542 12/14/23 1453       Hip (Therapeutic Exercise)    Hip (Therapeutic Exercise) isometric exercises  -RH isometric exercises  -RH    Hip Isometrics (Therapeutic Exercise) left;gluteal sets;10 repetitions;2 sets  -RH left;gluteal sets;10 repetitions;2 sets  -RH      Row Name 12/14/23 1542 12/14/23 1453       Knee (Therapeutic Exercise)    Knee (Therapeutic Exercise) strengthening exercise;isometric exercises  -RH strengthening exercise;isometric exercises  -RH    Knee Isometrics (Therapeutic Exercise) left;quad sets;10 repetitions;2 sets  -RH left;quad sets;10 repetitions;2 sets  -RH    Knee Strengthening (Therapeutic Exercise) left;heel slides;SLR (straight leg raise);SAQ (short arc quad);LAQ (long arc quad);10 repetitions;2 sets  -RH left;heel slides;SLR (straight leg raise);SAQ (short arc quad);LAQ (long arc quad);10 repetitions;2 sets  -RH      Row Name 12/14/23 1542 12/14/23 1453       Ankle (Therapeutic Exercise)    Ankle (Therapeutic Exercise) AROM (active range of motion)  -RH AROM (active range of motion)  -RH    Ankle AROM (Therapeutic Exercise) left;dorsiflexion;plantarflexion;10 repetitions;2 sets  -RH left;dorsiflexion;plantarflexion;10 repetitions;2 sets  -RH      Row Name             Wound 12/13/23 1500 Left anterior knee Incision    Wound - Properties Group Placement Date: 12/13/23  -LB Placement Time: 1500  -LB Present on Original Admission: N  -LB Side: Left  -LB Orientation: anterior  -LB Location: knee  -LB Primary Wound Type: Incision  -LB, left total knee arthroplasty with nallely- left     Retired Wound - Properties Group Placement Date: 12/13/23  -LB Placement Time: 1500  -LB Present on Original Admission: N  -LB Side: Left  -LB  Orientation: anterior  -LB Location: knee  -LB Primary Wound Type: Incision  -LB, left total knee arthroplasty with nallely- left     Retired Wound - Properties Group Date first assessed: 12/13/23  -LB Time first assessed: 1500  -LB Present on Original Admission: N  -LB Side: Left  -LB Location: knee  -LB Primary Wound Type: Incision  -LB, left total knee arthroplasty with nallely- left       Row Name 12/14/23 1300          Plan of Care Review    Plan of Care Reviewed With patient  -LUH     Outcome Evaluation Pt presents with decreased ROM, strength, transfers and ambulation.  Skilled PT services will be required to address these mobility deficits.  -LUH       Row Name 12/14/23 4691          Positioning and Restraints    Post Treatment Position chair  -RH     In Chair exit alarm on;encouraged to call for assist;call light within reach;reclined  -       Row Name 12/14/23 1300          Therapy Assessment/Plan (PT)    Patient/Family Therapy Goals Statement (PT) Pt wants to walk without pain  -LUH     Rehab Potential (PT) good, to achieve stated therapy goals  -LUH     Criteria for Skilled Interventions Met (PT) skilled treatment is necessary  -LUH     Therapy Frequency (PT) 2 times/day  -LUH     Predicted Duration of Therapy Intervention (PT) 10 days  -LUH     Problem List (PT) problems related to;balance;mobility;range of motion (ROM);strength;pain  -LUH     Activity Limitations Related to Problem List (PT) unable to ambulate safely;unable to transfer safely  -LUH       Row Name 12/14/23 1300          PT Evaluation Complexity    History, PT Evaluation Complexity no personal factors and/or comorbidities  -LUH     Examination of Body Systems (PT Eval Complexity) total of 4 or more elements  -LUH     Clinical Presentation (PT Evaluation Complexity) stable  -LUH     Clinical Decision Making (PT Evaluation Complexity) low complexity  -LUH     Overall Complexity (PT Evaluation Complexity) low complexity  -LUH       Row Name 12/14/23 2311  12/14/23 1453       Progress Summary (PT)    Progress Toward Functional Goals (PT) progress toward functional goals is good  -RH progress toward functional goals is good  -RH    Daily Progress Summary (PT) Pt is progressing well with their exercise program.  Will continue current plan of care.  -RH Pt is progressing well with their exercise program.  Will continue current plan of care.  -RH      Row Name 12/14/23 1300          Therapy Plan Review/Discharge Plan (PT)    Therapy Plan Review (PT) evaluation/treatment results reviewed;participants voiced agreement with care plan;participants included;patient  -LUH       Row Name 12/14/23 1300          Physical Therapy Goals    Transfer Goal Selection (PT) transfer, PT goal 1  -LUH     Gait Training Goal Selection (PT) gait training, PT goal 1  -LUH     ROM Goal Selection (PT) ROM, PT goal 1  -LUH     Strength Goal Selection (PT) strength, PT goal 1  -LUH       Row Name 12/14/23 1300          Transfer Goal 1 (PT)    Activity/Assistive Device (Transfer Goal 1, PT) transfers, all  -LUH     [place] Level/Cues Needed (Transfer Goal 1, PT) independent  -LUH     Time Frame (Transfer Goal 1, PT) long term goal (LTG);10 days  -LUH       Row Name 12/14/23 1300          Gait Training Goal 1 (PT)    Activity/Assistive Device (Gait Training Goal 1, PT) gait (walking locomotion);walker, rolling  -LUH     [place] Level (Gait Training Goal 1, PT) independent  -LUH     Distance (Gait Training Goal 1, PT) 300  -LUH     Time Frame (Gait Training Goal 1, PT) long term goal (LTG);10 days  -LUH       Row Name 12/14/23 1300          ROM Goal 1 (PT)    ROM Goal 1 (PT) Pt will demonstrate knee ROM from 0-110° on the affected side.  -LUH     Time Frame (ROM Goal 1, PT) long-term goal (LTG);10 days  -LUH       Row Name 12/14/23 1300          Strength Goal 1 (PT)    Strength Goal 1 (PT) Pt will demonstrate knee extension strength of 5/5 on the affected side.  -LUH     Time Frame (Strength Goal 1, PT)  long term goal (LTG);10 days  -LUH               User Key  (r) = Recorded By, (t) = Taken By, (c) = Cosigned By      Initials Name Provider Type    Luis Greer, RN Registered Nurse    Adonay Boo PTA Physical Therapist Assistant    Zachariah Cuadra PT Physical Therapist                    Physical Therapy Education       Title: PT OT SLP Therapies (Resolved)       Topic: Physical Therapy (Resolved)       Point: Mobility training (Resolved)       Learning Progress Summary             Patient Acceptance, E,TB, VU by [facility] at 12/14/2023 1305                         Point: Precautions (Resolved)       Learning Progress Summary             Patient Acceptance, E,TB, VU by [facility] at 12/14/2023 1305                                         User Key       Initials Effective Dates Name Provider Type Discipline    [facility]H 06/03/21 -  Zachariah Guillermo PT Physical Therapist PT                  PT Recommendation and Plan     Progress Summary (PT)  Progress Toward Functional Goals (PT): progress toward functional goals is good  Daily Progress Summary (PT): Pt is progressing well with their exercise program.  Will continue current plan of care.   Outcome Measures       Row Name 12/14/23 1400 12/14/23 1300          How much help from another person do you currently need...    Turning from your back to your side while in flat bed without using bedrails? 4  -RH 4  -LUH     Moving from lying on back to sitting on the side of a flat bed without bedrails? 4  -RH 4  -LUH     Moving to and from a bed to a chair (including a wheelchair)? 3  -RH 3  -LUH     Standing up from a chair using your arms (e.g., wheelchair, bedside chair)? 4  -RH 3  -LUH     Climbing 3-5 steps with a railing? 4  -RH 3  -LUH     To walk in hospital room? 4  -RH 3  -LUH     AM-PAC 6 Clicks Score (PT) 23  -RH 20  -LUH     Highest Level of Mobility Goal 7 --> Walk 25 feet or more  -RH 6 --> Walk 10 steps or more  -LUH        Functional Assessment    Outcome Measure  Options -- AM-PAC 6 Clicks Basic Mobility (PT)  -LUH               User Key  (r) = Recorded By, (t) = Taken By, (c) = Cosigned By      Initials Name Provider Type    RH Adonay Pineda PTA Physical Therapist Assistant    Zachariah Cuadra, PT Physical Therapist                     Time Calculation:    PT Charges       Row Name 12/14/23 1541 12/14/23 1452 12/14/23 1301       Time Calculation    PT Received On 12/14/23  -RH 12/14/23  -RH 12/14/23  -LUH    PT Goal Re-Cert Due Date -- -- 12/23/23  -LUH       Timed Charges    81123 - Gait Training Minutes  8  -RH 9  -RH --    75301 - PT Therapeutic Activity Minutes 4  -RH 4  -RH --       Untimed Charges    PT Eval/Re-eval Minutes -- -- 20  -LUH    PT Group Therapy Minutes 25  -RH 35  -RH --       Total Minutes    Timed Charges Total Minutes 12  -RH 13  -RH --    Untimed Charges Total Minutes 25  -RH 35  -RH 20  -LUH     Total Minutes 37  -RH 48  -RH 20  -LUH              User Key  (r) = Recorded By, (t) = Taken By, (c) = Cosigned By      Initials Name Provider Type     Adonay Pineda PTA Physical Therapist Assistant    Zachariah Cuadra, PT Physical Therapist                  Therapy Charges for Today       Code Description Service Date Service Provider Modifiers Qty    31147925320 HC GAIT TRAINING EA 15 MIN 12/14/2023 Adonay Pineda PTA GP 1    56280029741 HC PT THER PROC GROUP 12/14/2023 Adonay Pineda PTA GP 1    79712478870 HC GAIT TRAINING EA 15 MIN 12/14/2023 Adonay Pineda PTA GP 1    68021098226 HC PT THER PROC GROUP 12/14/2023 Adonay Pineda PTA GP 1            PT G-Codes  Outcome Measure Options: AM-PAC 6 Clicks Basic Mobility (PT)  AM-PAC 6 Clicks Score (PT): 23  AM-PAC 6 Clicks Score (OT): 20    Adonay Pineda PTA  12/14/2023

## 2023-12-14 NOTE — PLAN OF CARE
Goal Outcome Evaluation:  Plan of Care Reviewed With: patient        Progress: no change  Outcome Evaluation: pt will discharge home this shift. follow up appointment made and education given to pt about wound care. no other changes at this time.

## 2023-12-14 NOTE — DISCHARGE SUMMARY
[facility]         HOSPITALIST  DISCHARGE SUMMARY    Patient Name: Basim Austin  : 1970  MRN: [id number]    Date of Admission: 2023  Date of Discharge:  2023  Primary Care Physician: Ai Red APRN    Consults       Date and Time Order Name Status Description    2023  4:37 PM Inpatient Hospitalist Consult Completed             Active and Resolved Hospital Problems:  Active Hospital Problems    Diagnosis POA    **Primary osteoarthritis of left knee [M17.12] Unknown    Osteoarthritis of left knee [M17.12] Yes      Resolved Hospital Problems   No resolved problems to display.       Hospital Course   Hospital Course:Basim Austin is a 53 y.o. male with left knee pain that is persistently worse with activity.  He is undergone total knee replacement.    Patient has hypertension.  After the procedure patient's temperature is 97.3, pulse was 75, respiratory rate is 12, blood pressure is 118/65, and he is on 2 L of oxygen saturating 93%.     DISCHARGE Follow Up Recommendations for labs and diagnostics:   Weightbearing as tolerated with walker  Physical and Occupational Therapy  Pain control  DVT prophylaxis      Day of Discharge   Vital Signs:  Temp:  [97.4 °F (36.3 °C)-98.2 °F (36.8 °C)] 98.2 °F (36.8 °C)  Heart Rate:  [66-90] 68  Resp:  [12-21] 20  BP: (104-128)/(58-76) 125/74  Flow (L/min):  [2] 2  Physical Exam: Constitutional: Awake, alert, no acute distress              Eyes: Pupils equal, sclerae anicteric, no conjunctival injection              HENT: NCAT, mucous membranes moist              Neck: Supple, no thyromegaly, no lymphadenopathy, trachea midline              Respiratory: Clear to auscultation bilaterally, nonlabored respirations               Cardiovascular: RRR, no murmurs, rubs, or gallops, palpable pedal pulses bilaterally              Gastrointestinal: Positive bowel sounds, soft, nontender, nondistended              Musculoskeletal: No  bilateral ankle edema, no clubbing or cyanosis to extremities              Psychiatric: Appropriate affect, cooperative              Neurologic: Oriented x 3, strength symmetric in all extremities, Cranial Nerves grossly intact to confrontation, speech clear              Skin: No rashes       Discharge Details        Discharge Medications        New Medications        Instructions Start Date   apixaban 2.5 MG tablet tablet  Commonly known as: ELIQUIS   2.5 mg, Oral, 2 Times Daily      aspirin 325 MG EC tablet  Commonly known as: Ecotrin   Take 1 tablet by mouth Daily. Start after Eliquis is completed.   Start Date: December 29, 2023     HYDROcodone-acetaminophen 7.5-325 MG per tablet  Commonly known as: Norco   1-2 tablets, Oral, Every 4 Hours PRN      naloxone 4 MG/0.1ML nasal spray  Commonly known as: NARCAN   Call 911. Don't prime. [place] in 1 nostril for overdose. Repeat in 2-3 minutes in other nostril if no or minimal breathing/responsiveness.             Continue These Medications        Instructions Start Date   allopurinol 100 MG tablet  Commonly known as: ZYLOPRIM   Take 1 tablet by mouth Daily.      amLODIPine 10 MG tablet  Commonly known as: NORVASC   10 mg, Oral, Daily      losartan 100 MG tablet  Commonly known as: COZAAR   100 mg, Oral, Daily             Stop These Medications      Relafen 750 MG tablet  Generic drug: nabumetone     sulindac 200 MG tablet  Commonly known as: CLINORIL              No Known Allergies    Discharge Disposition:  Home or Self Care    Diet:  Hospital:  Diet Order   Procedures    Diet: Regular/House Diet; Texture: Regular Texture (IDDSI 7); Fluid Consistency: Thin (IDDSI 0)       Discharge Activity:   Activity Instructions       Discharge Activity      Follow-up 2 weeks  Call with any problems   Norco prescription  Dressing change postoperative day #3 and then leave [facility] 5 to 7 days.  May shower with dressing in place.  Tibial pin site dressing change daily  And start  Ecotrin after Eliquis completed  Weightbearing as tolerated  Physical therapy            CODE STATUS:  There are no questions and answers to display.         Future Appointments   Date Time Provider Department Center   12/27/2023  8:45 AM Salvador Longo PA [facility]       Additional Instructions for the Follow-ups that You Need to Schedule       Discharge Follow-up with Specified Provider: Donta; 2 Weeks   As directed      To: Donta   Follow Up: 2 Weeks                Pertinent  and/or Most Recent Results     PROCEDURES:       LAB RESULTS:      Lab 12/14/23  0536   HEMOGLOBIN 12.8*   HEMATOCRIT 38.9                             Brief Urine Lab Results       None          Microbiology Results (last 10 days)       ** No results found for the last 240 hours. **            XR Knee 1 or 2 View Left    Result Date: 12/13/2023    1. Changes from underlying knee arthroplasty.      CARMEN PAVON MD       Electronically Signed and Approved By: CARMEN PAVON MD on 12/13/2023 at 17:13                           Labs Pending at Discharge:        Time spent on Discharge including face to face service:  greater than 30  minutes    Electronically signed by Fernando Leos DO, 12/14/23, 2:21 PM EST.

## 2023-12-14 NOTE — THERAPY EVALUATION
Patient Name: Basim Austin  : 1970    MRN: [id number]                              Today's Date: 2023       Admit Date: 2023    Visit Dx:     ICD-10-CM ICD-9-CM   1. Primary osteoarthritis of left knee  M17.12 715.16     Patient Active Problem List   Diagnosis    Class 2 severe obesity with serious comorbidity and body mass index (BMI) of 39.0 to 39.9 in adult    Primary hypertension    Primary osteoarthritis of left knee    Osteoarthritis of left knee     Past Medical History:   Diagnosis Date    Arthritis 2012    Hypertension 2005    Knee swelling 2023     Past Surgical History:   Procedure Laterality Date    COLONOSCOPY  ,,    Estimate    HERNIA REPAIR      Estimated       General Information       Row Name 23 0806 23 0759       OT Time and Intention    Document Type therapy note (daily note)  -PG evaluation  -PG    Mode of Treatment individual therapy;occupational therapy  -PG individual therapy;occupational therapy  -PG      Row Name 23 0759          General Information    Prior Level of Function transfer;ADL's;independent:  -PG     Existing Precautions/Restrictions fall  -PG     Barriers to Rehab none identified  -PG       Row Name 23 0759          Occupational Profile    Reason for Services/Referral (Occupational Profile) Patient is a 53-year-old male admitted for left total knee arthroplasty.  No previous OT services identified.  Patient is being evaluated by Occupational Therapy due to recent decline in ADL function.  -PG       Row Name 23 0759          Living Environment    People in Home spouse  -PG       Row Name 23 0759          Cognition    Orientation Status (Cognition) oriented x 3  -PG       Row Name 23 0759          Safety Issues, Functional Mobility    Impairments Affecting Function (Mobility) balance;range of motion (ROM);pain  -PG               User Key  (r) = Recorded By, (t) = Taken By, (c) = Cosigned  By      Initials Name Provider Type    PG Jose Zepeda, OT Occupational Therapist                     Mobility/ADL's       Row Name 12/14/23 0806 12/14/23 0800       Transfers    Transfers bed-chair transfer  -PG bed-chair transfer  -PG      Row Name 12/14/23 0806 12/14/23 0800       Bed-Chair Transfer    Bed-Chair Jessie (Transfers) contact guard;verbal cues  -PG contact guard;verbal cues  -PG    Assistive Device (Bed-Chair Transfers) walker, front-wheeled  -PG walker, front-wheeled  -PG      Row Name 12/14/23 0800          Activities of Daily Living    BADL Assessment/Intervention bathing;upper body dressing;lower body dressing;grooming;toileting  -PG       Row Name 12/14/23 0806 12/14/23 0800       Bathing Assessment/Intervention    Jessie Level (Bathing) bathing skills;minimum assist (75% patient effort)  -PG bathing skills;minimum assist (75% patient effort)  -PG    Position (Bathing) supported standing  -PG supported standing  -PG      Row Name 12/14/23 0806 12/14/23 0800       Upper Body Dressing Assessment/Training    Jessie Level (Upper Body Dressing) upper body dressing skills;set up  -PG upper body dressing skills;set up  -PG      Row Name 12/14/23 0806 12/14/23 0800       Lower Body Dressing Assessment/Training    Jessie Level (Lower Body Dressing) lower body dressing skills;minimum assist (75% patient effort)  -PG lower body dressing skills;minimum assist (75% patient effort)  -PG    Position (Lower Body Dressing) supported standing  -PG supported standing  -PG      Row Name 12/14/23 0806 12/14/23 0800       Grooming Assessment/Training    Jessie Level (Grooming) grooming skills;set up  -PG grooming skills;set up  -PG      Row Name 12/14/23 0806 12/14/23 0800       Toileting Assessment/Training    Jessie Level (Toileting) toileting skills;contact guard assist  -PG toileting skills;contact guard assist  -PG    Position (Toileting) supported standing  -PG supported  standing  -PG              User Key  (r) = Recorded By, (t) = Taken By, (c) = Cosigned By      Initials Name Provider Type    PG Jose Zepeda OT Occupational Therapist                   Obj/Interventions       Row Name 12/14/23 0801          Sensory Assessment (Somatosensory)    Sensory Assessment (Somatosensory) sensation intact  -PG       Row Name 12/14/23 0801          Vision Assessment/Intervention    Visual Impairment/Limitations WFL  -PG       Row Name 12/14/23 0801          Range of Motion Comprehensive    General Range of Motion no range of motion deficits identified  -PG       Row Name 12/14/23 0801          Strength Comprehensive (MMT)    General Manual Muscle Testing (MMT) Assessment no strength deficits identified  -PG       Row Name 12/14/23 0801          Motor Skills    Motor Skills coordination;functional endurance  -PG     Coordination WFL  -PG     Functional Endurance Fair plus  -PG               User Key  (r) = Recorded By, (t) = Taken By, (c) = Cosigned By      Initials Name Provider Type    PG Joes Zepeda OT Occupational Therapist                   Goals/Plan       Row Name 12/14/23 0802          Transfer Goal 1 (OT)    Activity/Assistive Device (Transfer Goal 1, OT) transfers, all  -PG     [place] Level/Cues Needed (Transfer Goal 1, OT) modified independence  -PG     Time Frame (Transfer Goal 1, OT) long term goal (LTG);10 days  -PG       Row Name 12/14/23 0802          Bathing Goal 1 (OT)    Activity/Device (Bathing Goal 1, OT) bathing skills, all  -PG     [place] Level/Cues Needed (Bathing Goal 1, OT) modified independence  -PG     Time Frame (Bathing Goal 1, OT) long term goal (LTG);10 days  -PG       Row Name 12/14/23 0802          Dressing Goal 1 (OT)    Activity/Device (Dressing Goal 1, OT) dressing skills, all  -PG     [place]/Cues Needed (Dressing Goal 1, OT) modified independence  -PG     Time Frame (Dressing Goal 1, OT) long term goal (LTG);10 days  -PG       Row  Name 12/14/23 0802          Toileting Goal 1 (OT)    Activity/Device (Toileting Goal 1, OT) toileting skills, all  -PG     [place] Level/Cues Needed (Toileting Goal 1, OT) modified independence  -PG     Time Frame (Toileting Goal 1, OT) long term goal (LTG);10 days  -PG       Row Name 12/14/23 0802          Grooming Goal 1 (OT)    Activity/Device (Grooming Goal 1, OT) grooming skills, all  -PG     Dent (Grooming Goal 1, OT) modified independence  -PG     Time Frame (Grooming Goal 1, OT) long term goal (LTG);10 days  -PG       Row Name 12/14/23 0802          Therapy Assessment/Plan (OT)    Planned Therapy Interventions (OT) BADL retraining;occupation/activity based interventions;patient/caregiver education/training;transfer/mobility retraining  -PG               User Key  (r) = Recorded By, (t) = Taken By, (c) = Cosigned By      Initials Name Provider Type    PG Jose Zepeda, OT Occupational Therapist                   Clinical Impression       Row Name 12/14/23 0801          Pain Assessment    Pretreatment Pain Rating 5/10  -PG     Posttreatment Pain Rating 5/10  -PG     Pain Location - Side/Orientation Left  -PG     Pain Location - knee  -PG     Pain Intervention(s) Nursing Notified  -PG       Row Name 12/14/23 0801          Plan of Care Review    Plan of Care Reviewed With patient  -PG     Progress no change  -PG     Outcome Evaluation Patient presents with limitations affecting strength, activity tolerance, and balance impacting patient's ability to return home safely and independently.  The skills of a therapist will be required to safely and effectively implement the following treatment plan to restore maximal level of function  -PG       Row Name 12/14/23 0801          Therapy Assessment/Plan (OT)    Patient/Family Therapy Goal Statement (OT) Walk without pain  -PG     Rehab Potential (OT) good, to achieve stated therapy goals  -PG     Criteria for Skilled Therapeutic Interventions Met (OT)  yes;meets criteria;skilled treatment is necessary  -PG     Therapy Frequency (OT) 5 times/wk  -PG       Row Name 12/14/23 0801          Therapy Plan Review/Discharge Plan (OT)    Anticipated Discharge Disposition (OT) home with outpatient therapy services  -PG               User Key  (r) = Recorded By, (t) = Taken By, (c) = Cosigned By      Initials Name Provider Type    PG Jose Zepeda, OT Occupational Therapist                   Outcome Measures       Row Name 12/14/23 0803          How much help from another is currently needed...    Putting on and taking off regular lower body clothing? 3  -PG     Bathing (including washing, rinsing, and drying) 3  -PG     Toileting (which includes using toilet bed pan or urinal) 3  -PG     Putting on and taking off regular upper body clothing 3  -PG     Taking care of personal grooming (such as brushing teeth) 4  -PG     Eating meals 4  -PG     AM-PAC 6 Clicks Score (OT) 20  -PG       Row Name 12/14/23 0720 12/13/23 6882       How much help from another person do you currently need...    Turning from your back to your side while in flat bed without using bedrails? 4  -AH 3  -SV    Moving from lying on back to sitting on the side of a flat bed without bedrails? 3  -AH 3  -SV    Moving to and from a bed to a chair (including a wheelchair)? 3  -AH 3  -SV    Standing up from a chair using your arms (e.g., wheelchair, bedside chair)? 3  -AH 3  -SV    Climbing 3-5 steps with a railing? 2  -AH 2  -SV    To walk in hospital room? 3  -AH 3  -SV    AM-PAC 6 Clicks Score (PT) 18  -AH 17  -SV    Highest Level of Mobility Goal 6 --> Walk 10 steps or more  -AH 5 --> Static standing  -SV      Row Name 12/14/23 0803          Functional Assessment    Outcome Measure Options AM-PAC 6 Clicks Daily Activity (OT);Optimal Instrument  -PG       Row Name 12/14/23 0803          Optimal Instrument    Optimal Instrument Optimal - 3  -PG     Bending/Stooping 3  -PG     Standing 2  -PG     Reaching 1  -PG      From the list, choose the 3 activities you would most like to be able to do without any difficulty Bending/stooping;Standing;Reaching  -PG     Total Score Optimal - 3 6  -PG               User Key  (r) = Recorded By, (t) = Taken By, (c) = Cosigned By      Initials Name Provider Type     Gracy Meza, RN Registered Nurse    Soumya Quinn RN Registered Nurse    Jose Bray OT Occupational Therapist                    Occupational Therapy Education       Title: PT OT SLP Therapies (Done)       Topic: Occupational Therapy (Done)       Point: ADL training (Done)       Description:   Instruct learner(s) on proper safety adaptation and remediation techniques during self care or transfers.   Instruct in proper use of assistive devices.                  Learning Progress Summary             Patient Acceptance, E,D, DU by PG at 12/14/2023 0804                         Point: Home exercise program (Done)       Description:   Instruct learner(s) on appropriate technique for monitoring, assisting and/or progressing therapeutic exercises/activities.                  Learning Progress Summary             Patient Acceptance, E,D, DU by PG at 12/14/2023 0804                         Point: Precautions (Done)       Description:   Instruct learner(s) on prescribed precautions during self-care and functional transfers.                  Learning Progress Summary             Patient Acceptance, E,D, DU by PG at 12/14/2023 0804                         Point: Body mechanics (Done)       Description:   Instruct learner(s) on proper positioning and spine alignment during self-care, functional mobility activities and/or exercises.                  Learning Progress Summary             Patient Acceptance, E,D, DU by PG at 12/14/2023 0804                                         User Key       Initials Effective Dates Name Provider Type Discipline    PG 06/16/21 -  Jose Zepeda OT Occupational Therapist OT                  OT  Recommendation and Plan  Planned Therapy Interventions (OT): BADL retraining, occupation/activity based interventions, patient/caregiver education/training, transfer/mobility retraining  Therapy Frequency (OT): 5 times/wk  Plan of Care Review  Plan of Care Reviewed With: patient  Progress: no change  Outcome Evaluation: Patient presents with limitations affecting strength, activity tolerance, and balance impacting patient's ability to return home safely and independently.  The skills of a therapist will be required to safely and effectively implement the following treatment plan to restore maximal level of function     Time Calculation:   Evaluation Complexity (OT)  Review Occupational Profile/Medical/Therapy History Complexity: brief/low complexity  Assessment, Occupational Performance/Identification of Deficit Complexity: 3-5 performance deficits  Clinical Decision Making Complexity (OT): problem focused assessment/low complexity  Overall Complexity of Evaluation (OT): low complexity     Time Calculation- OT       Row Name 12/14/23 0807             Time Calculation- OT    OT Received On 12/14/23  -PG      OT Goal Re-Cert Due Date 12/23/23  -PG         Timed Charges    65537 - OT Therapeutic Activity Minutes 10  -PG      10238 - OT Self Care/Mgmt Minutes 15  -PG         Untimed Charges    OT Eval/Re-eval Minutes 20  -PG         Total Minutes    Timed Charges Total Minutes 25  -PG      Untimed Charges Total Minutes 20  -PG       Total Minutes 45  -PG                User Key  (r) = Recorded By, (t) = Taken By, (c) = Cosigned By      Initials Name Provider Type    PG Jose Zepeda OT Occupational Therapist                  Therapy Charges for Today       Code Description Service Date Service Provider Modifiers Qty    21847327603 HC OT THERAPEUTIC ACT EA 15 MIN 12/14/2023 Jose Zepeda OT GO 1    81394576866 HC OT SELF CARE/MGMT/TRAIN EA 15 MIN 12/14/2023 Jose Zepeda OT GO 1    06350612952 HC OT EVAL LOW  COMPLEXITY 2 12/14/2023 Jose Zepeda, OT GO 1                 Jose Zepeda, RANI  12/14/2023

## 2023-12-14 NOTE — PLAN OF CARE
Goal Outcome Evaluation:  Plan of Care Reviewed With: patient        Progress: no change  Outcome Evaluation: pt. is a one person assist with walker and has ambulated 160 feet this shift.  Voiding without difficulty.  Medicated x 2 for pain with relief noted.  Upon discharge pt. Is going home with outpt therapy.

## 2023-12-14 NOTE — PROGRESS NOTES
[facility]     Progress Note    Patient Name: Basim Austin  : 1970  MRN: [id number]  Primary Care Physician:  Ai Red APRN  Date of admission: 2023    Subjective   Subjective     HPI:  Patient Reports doing well this morning.  Has been up walking this morning.  He denies any chest pain or shortness of air.    Review of Systems   See HPI    Objective   Objective     Vitals:   Temp:  [96.8 °F (36 °C)-98.1 °F (36.7 °C)] 97.7 °F (36.5 °C)  Heart Rate:  [63-90] 70  Resp:  [12-21] 19  BP: (104-153)/(58-88) 118/73  Flow (L/min):  [2] 2  Physical Exam    General: Alert, no acute distress   Chest: Unlabored breathing, cardiovascular: Regular heart rate   Musculoskeletal: Neurovascular intact extremity.  Positive pulses.  Dressing intact.  Full extension.  Negative Morales.    Result Review      Hemoglobin   Date Value Ref Range Status   2023 12.8 (L) 13.0 - 17.7 g/dL Final     Hematocrit   Date Value Ref Range Status   2023 38.9 37.5 - 51.0 % Final        Result Review:  I have personally reviewed the results from the time of this admission to 2023 08:00 EST and agree with these findings:  [x]  Laboratory  []  Microbiology  [x]  Radiology  []  EKG/Telemetry   []  Cardiology/Vascular   []  Pathology  []  Old records  []  Other:      Assessment & Plan   Assessment / Plan     Brief Patient Summary:  Basim Austin is a 53 y.o. male who is status post left knee replacement    Active Hospital Problems:  Active Hospital Problems    Diagnosis     **Primary osteoarthritis of left knee     Osteoarthritis of left knee      Plan: Weightbearing as tolerated with walker  Physical and Occupational Therapy  Pain control  DVT prophylaxis  Discharge planning: Home later today       DVT prophylaxis:  Medical and mechanical DVT prophylaxis orders are present.    CODE STATUS:      Disposition:  I expect patient to be discharged later today.    Electronically signed by Ousmane Edwards MD,  12/14/23, 8:00 AM EST.

## 2023-12-18 ENCOUNTER — TELEPHONE (OUTPATIENT)
Dept: ORTHOPEDIC SURGERY | Facility: CLINIC | Age: 53
End: 2023-12-18
Payer: COMMERCIAL

## 2023-12-18 DIAGNOSIS — M17.12 PRIMARY OSTEOARTHRITIS OF LEFT KNEE: ICD-10-CM

## 2023-12-18 RX ORDER — HYDROCODONE BITARTRATE AND ACETAMINOPHEN 7.5; 325 MG/1; MG/1
1-2 TABLET ORAL EVERY 4 HOURS PRN
Qty: 40 TABLET | Refills: 0 | Status: SHIPPED | OUTPATIENT
Start: 2023-12-18 | End: 2023-12-22 | Stop reason: SDUPTHER

## 2023-12-22 ENCOUNTER — TELEPHONE (OUTPATIENT)
Dept: ORTHOPEDIC SURGERY | Facility: CLINIC | Age: 53
End: 2023-12-22
Payer: COMMERCIAL

## 2023-12-22 DIAGNOSIS — M17.12 PRIMARY OSTEOARTHRITIS OF LEFT KNEE: ICD-10-CM

## 2023-12-22 RX ORDER — HYDROCODONE BITARTRATE AND ACETAMINOPHEN 7.5; 325 MG/1; MG/1
1 TABLET ORAL EVERY 4 HOURS PRN
Qty: 42 TABLET | Refills: 0 | Status: SHIPPED | OUTPATIENT
Start: 2023-12-22

## 2023-12-22 NOTE — TELEPHONE ENCOUNTER
Caller: DILAN     Relationship: WIFE     Best call back number: [phone]    Requested Prescriptions:   Requested Prescriptions     Pending Prescriptions Disp Refills    HYDROcodone-acetaminophen (Norco) 7.5-325 MG per tablet 40 tablet 0     Sig: Take 1-2 tablets by mouth Every 4 (Four) Hours As Needed for Moderate Pain.        Pharm[facility] where request should be sent:  [facility]    Last office visit with prescribing clinician: 8/17/2023   Last telemedicine visit with prescribing clinician: Visit date not found   Next office visit with prescribing clinician: Visit date not found       Does the patient have less than a 3 day supply:  [x] Yes  [] No    Would you like a call back once the refill request has been completed: [x] Yes [] No    If the office needs to give you a call back, can they leave a voicemail: [x] Yes [] No    Liliana Mcfarland Rep   12/22/23 16:03 EST

## 2023-12-27 ENCOUNTER — OFFICE VISIT (OUTPATIENT)
Dept: ORTHOPEDIC SURGERY | Facility: CLINIC | Age: 53
End: 2023-12-27
Payer: COMMERCIAL

## 2023-12-27 VITALS
HEART RATE: 80 BPM | OXYGEN SATURATION: 95 % | SYSTOLIC BLOOD PRESSURE: 125 MMHG | BODY MASS INDEX: 39.2 KG/M2 | WEIGHT: 280 LBS | HEIGHT: 71 IN | DIASTOLIC BLOOD PRESSURE: 84 MMHG

## 2023-12-27 DIAGNOSIS — Z47.89 AFTERCARE FOLLOWING SURGERY OF THE MUSCULOSKELETAL SYSTEM: Primary | ICD-10-CM

## 2023-12-27 RX ORDER — OXYCODONE AND ACETAMINOPHEN 7.5; 325 MG/1; MG/1
1 TABLET ORAL EVERY 4 HOURS PRN
Qty: 42 TABLET | Refills: 0 | Status: SHIPPED | OUTPATIENT
Start: 2023-12-27

## 2023-12-27 RX ORDER — CYCLOBENZAPRINE HCL 10 MG
10 TABLET ORAL 3 TIMES DAILY PRN
Qty: 30 TABLET | Refills: 0 | Status: SHIPPED | OUTPATIENT
Start: 2023-12-27 | End: 2024-01-06

## 2023-12-27 NOTE — PROGRESS NOTES
Chief Complaint  Follow-up of the Left Knee    Subjective          History of Present Illness      Basim Austin is a 53 y.o. male  presents to [facility] for     Patient presents with his wife Skye for 2-week postop evaluation of left total knee arthroplasty, 2023.  Patient states it has been very painful recovery he states the knee is swollen and painful.  He denies calf pain and has been taking DVT prophylaxis.  Patient and wife state he is attending therapy and he is working hard at home exercises.  He does state it hurts to bend and straighten his knee.  He states he is unable to actively extend his knee due to weakness and pain.  They state the incision has been healing well and denies redness drainage from the incision sites.  Patient presents with a walker.  He is taking pain medication but has had trouble with pain control we are working with his pharmacy to get him a stronger prescription.  Patient denies fever/chills.  Patient states he does have some quadricep muscle tightness and spasms he is requesting a muscle relaxer.      No Known Allergies     Social History     Socioeconomic History    Marital status:    Tobacco Use    Smoking status: Former     Types: Cigars     Quit date:      Years since quittin.9     Passive exposure: Never    Smokeless tobacco: Never    Tobacco comments:     Stopped Smoking 2022   Vaping Use    Vaping Use: Some days    Substances: Nicotine, Flavoring   Substance and Sexual Activity    Alcohol use: Never    Drug use: Never    Sexual activity: Defer        REVIEW OF SYSTEMS    Constitutional: Awake alert and oriented x3, no acute distress, denies fevers, chills, weight loss  Respiratory: No respiratory distress  Vascular: Brisk cap refill, Intact distal pulses, No cyanosis, compartments soft with no signs or symptoms of compartment syndrome or DVT.   Cardiovascular: Denies chest pain, shortness of breath  Skin: Denies  "rashes, acute skin changes  Neurologic: Denies headache, loss of consciousness  MSK: Left knee pain      Objective   Vital Signs:   /84   Pulse 80   Ht 180.3 cm (71\")   Wt 127 kg (280 lb)   SpO2 95%   BMI 39.05 kg/m²     Body mass index is 39.05 kg/m².    Physical Exam       Left knee: Incisions are healing well, no erythema, no drainage or dehiscence, no signs of infection, generalized swelling to the knee, nontender calf, negative Morales testing, patient is unable to actively extend his knee, with passive extension he is at -5, flexion 90 this is active flexion to 90 degrees.  Nontender calf, negative Morales testing.    Procedures    Imaging Results (Most Recent)       Procedure Component Value Units Date/Time    XR Knee 3 View Left [[id number]] Resulted: 12/27/23 0935     Updated: 12/27/23 0936    Narrative:      View:AP/Lateral and Sunrise view(s)  Site: Left knee  Indication: Left knee pain  Study: X-rays ordered, taken in the office, and reviewed today  X-ray: Intact appearing left total knee arthroplasty, no signs of hardware   failure or loosening, no subsidence or periprosthetic fracture, good   alignment  Comparative data: Postoperative studies               Result Review :   The following data was reviewed by: ADIN Hall on 12/27/2023:  Data reviewed : Radiologic studies reviewed by me with the patient and his wife              Assessment and Plan    Diagnoses and all orders for this visit:    1. Aftercare following surgery of left total knee arthroplasty 12/13/2023 (Primary)  -     XR Knee 3 View Left    Other orders  -     cyclobenzaprine (FLEXERIL) 10 MG tablet; Take 1 tablet by mouth 3 (Three) Times a Day As Needed for Muscle Spasms for up to 10 days.  Dispense: 30 tablet; Refill: 0        Reviewed x-rays with the patient his wife discussed diagnosis and treatment options with him he was advised we will give him muscle relaxer for quadricep muscle spasms, he was advised to " continue physical therapy for strength and range of motion, continue weightbearing as tolerated with walker use, pain medication prescription will be updated appropriately and he will take this as needed, sutures/staples removed in office today. Steri-strips applied. Discussed incision care/hygiene. May shower, but do not submerge in water until fully healed.  Advised patient that if any concerning symptoms regarding incision appearance occur that they should call us right away, patient expressed understanding.  Follow-up in 2 weeks for range of motion check patient was advised we will look for him to have gained active extension of his knee.  Follow-up in 2 weeks at [facility].    Call or return if worsening symptoms.    Follow Up   Return in about 2 weeks (around 1/10/2024) for Recheck.  Patient was given instructions and counseling regarding his condition or for health maintenance advice. Please see specific information pulled into the AVS if appropriate.       EMR Dragon/Transcription disclaimer:  Much of this encounter note is an electronic transcription/translation of spoken language to printed text, aka voice recognition.  The electronic translation of spoken language may permit erroneous or at times nonsensical words or phrases to be inadvertently transcribed; although I have reviewed the note for such errors, some may still exist so please interpret based on surrounding text content.

## 2023-12-27 NOTE — TELEPHONE ENCOUNTER
Patient was seen in the office today for his 2 week postop appointment and is requesting a refill on pain medicine.

## 2024-01-03 ENCOUNTER — TELEPHONE (OUTPATIENT)
Dept: ORTHOPEDIC SURGERY | Facility: CLINIC | Age: 54
End: 2024-01-03
Payer: COMMERCIAL

## 2024-01-03 DIAGNOSIS — Z47.89 AFTERCARE FOLLOWING SURGERY OF THE MUSCULOSKELETAL SYSTEM: ICD-10-CM

## 2024-01-03 RX ORDER — OXYCODONE AND ACETAMINOPHEN 7.5; 325 MG/1; MG/1
1 TABLET ORAL EVERY 4 HOURS PRN
Qty: 42 TABLET | Refills: 0 | Status: SHIPPED | OUTPATIENT
Start: 2024-01-03

## 2024-01-03 NOTE — TELEPHONE ENCOUNTER
PATIENT REQUESTING PAIN MEDICATION REFILL. [facility] IN [place].

## 2024-01-10 ENCOUNTER — TELEPHONE (OUTPATIENT)
Dept: ORTHOPEDIC SURGERY | Facility: CLINIC | Age: 54
End: 2024-01-10
Payer: COMMERCIAL

## 2024-01-10 DIAGNOSIS — Z47.89 AFTERCARE FOLLOWING SURGERY OF THE MUSCULOSKELETAL SYSTEM: ICD-10-CM

## 2024-01-10 RX ORDER — OXYCODONE AND ACETAMINOPHEN 7.5; 325 MG/1; MG/1
1 TABLET ORAL EVERY 4 HOURS PRN
Qty: 42 TABLET | Refills: 0 | Status: SHIPPED | OUTPATIENT
Start: 2024-01-10

## 2024-01-10 NOTE — TELEPHONE ENCOUNTER
PATIENT REQUESTING PAIN MEDICATION REFILL.   [facility] IN [place].

## 2024-01-11 ENCOUNTER — OFFICE VISIT (OUTPATIENT)
Dept: ORTHOPEDIC SURGERY | Facility: CLINIC | Age: 54
End: 2024-01-11
Payer: COMMERCIAL

## 2024-01-11 VITALS
BODY MASS INDEX: 39.2 KG/M2 | WEIGHT: 279.98 LBS | HEIGHT: 71 IN | HEART RATE: 77 BPM | OXYGEN SATURATION: 95 % | DIASTOLIC BLOOD PRESSURE: 88 MMHG | SYSTOLIC BLOOD PRESSURE: 138 MMHG

## 2024-01-11 DIAGNOSIS — Z47.89 AFTERCARE FOLLOWING SURGERY OF THE MUSCULOSKELETAL SYSTEM: Primary | ICD-10-CM

## 2024-01-11 PROCEDURE — 99024 POSTOP FOLLOW-UP VISIT: CPT | Performed by: PHYSICIAN ASSISTANT

## 2024-01-11 NOTE — PROGRESS NOTES
Chief Complaint  Pain and Follow-up of the Left Knee    Subjective          History of Present Illness      Basim Austin is a 53 y.o. male  presents to [facility] for     Patient presents with his wife Skye for follow-up evaluation of left total knee arthroplasty, 2023.  Patient is here for an early follow-up due to at his 2-week postop visit he was not able to actively extend his knee he had concern I had concern he has been working with therapy and he states his active extension returned within about 2 to 3 days after his 2-week postop visit.  He states he is taking pain medication with relief he just got a refill.  He states he finished the DVT prophylaxis and denies calf pain.  He is ambulating with a walker he is attending therapy 3 times a week with good results.  He states the incision has been healing well and his wife agrees.  He feels he has a knot to the lateral knee, denies fever or chills.      No Known Allergies     Social History     Socioeconomic History    Marital status:    Tobacco Use    Smoking status: Former     Types: Cigars     Quit date:      Years since quittin.0     Passive exposure: Never    Smokeless tobacco: Never    Tobacco comments:     Stopped Smoking 2022   Vaping Use    Vaping Use: Some days    Substances: Nicotine, Flavoring   Substance and Sexual Activity    Alcohol use: Never    Drug use: Never    Sexual activity: Defer        REVIEW OF SYSTEMS    Constitutional: Awake alert and oriented x3, no acute distress, denies fevers, chills, weight loss  Respiratory: No respiratory distress  Vascular: Brisk cap refill, Intact distal pulses, No cyanosis, compartments soft with no signs or symptoms of compartment syndrome or DVT.   Cardiovascular: Denies chest pain, shortness of breath  Skin: Denies rashes, acute skin changes  Neurologic: Denies headache, loss of consciousness  MSK: Left knee pain      Objective   Vital Signs:  "  /88   Pulse 77   Ht 180.3 cm (71\")   Wt 127 kg (279 lb 15.8 oz)   SpO2 95%   BMI 39.05 kg/m²     Body mass index is 39.05 kg/m².    Physical Exam       Left knee: Incisions are healing well, no erythema, no drainage or dehiscence, no scabbing, no fluctuance, no signs of infection, generalized swelling to the knee, no palpable knot or bump to the lateral knee, it is mild tenderness to the lateral knee, active extension, patient is able to actively hold a straight leg raise, extension -6 flexion 95, stable to varus/valgus stress stable anterior/posterior drawer, nontender calf, negative Morales testing      Procedures    Imaging Results (Most Recent)       None             Result Review :   The following data was reviewed by: ADIN Hall on 01/11/2024:               Assessment and Plan    Diagnoses and all orders for this visit:    1. Aftercare following surgery of left total knee arthroplasty 12/13/2023 (Primary)        Discussed diagnosis and treatment options with the patient he will continue therapy, continue strength/range of motion, continue pain medication, continue walker use and weightbearing as tolerated, may graduate to a cane soon, follow-up in 4 weeks for recheck with x-rays    Call or return if worsening symptoms.    Follow Up   Return in about 4 weeks (around 2/8/2024) for Recheck.  Patient was given instructions and counseling regarding his condition or for health maintenance advice. Please see specific information pulled into the AVS if appropriate.       EMR Dragon/Transcription disclaimer:  Much of this encounter note is an electronic transcription/translation of spoken language to printed text, aka voice recognition.  The electronic translation of spoken language may permit erroneous or at times nonsensical words or phrases to be inadvertently transcribed; although I have reviewed the note for such errors, some may still exist so please interpret based on surrounding " text content.

## 2024-01-18 ENCOUNTER — TELEPHONE (OUTPATIENT)
Dept: ORTHOPEDIC SURGERY | Facility: CLINIC | Age: 54
End: 2024-01-18
Payer: COMMERCIAL

## 2024-01-18 DIAGNOSIS — Z47.89 AFTERCARE FOLLOWING SURGERY OF THE MUSCULOSKELETAL SYSTEM: ICD-10-CM

## 2024-01-18 RX ORDER — OXYCODONE AND ACETAMINOPHEN 7.5; 325 MG/1; MG/1
1 TABLET ORAL EVERY 6 HOURS PRN
Qty: 28 TABLET | Refills: 0 | Status: SHIPPED | OUTPATIENT
Start: 2024-01-18

## 2024-01-18 NOTE — TELEPHONE ENCOUNTER
PATIENT REQUESTING PAIN MEDICATION REFILL.   [facility] [place].

## 2024-01-26 ENCOUNTER — TELEPHONE (OUTPATIENT)
Dept: ORTHOPEDIC SURGERY | Facility: CLINIC | Age: 54
End: 2024-01-26
Payer: COMMERCIAL

## 2024-01-26 DIAGNOSIS — Z47.89 AFTERCARE FOLLOWING SURGERY OF THE MUSCULOSKELETAL SYSTEM: ICD-10-CM

## 2024-01-26 RX ORDER — OXYCODONE AND ACETAMINOPHEN 7.5; 325 MG/1; MG/1
1 TABLET ORAL EVERY 6 HOURS PRN
Qty: 28 TABLET | Refills: 0 | Status: SHIPPED | OUTPATIENT
Start: 2024-01-26

## 2024-02-09 DIAGNOSIS — Z47.89 AFTERCARE FOLLOWING SURGERY OF THE MUSCULOSKELETAL SYSTEM: Primary | ICD-10-CM

## 2024-02-09 RX ORDER — HYDROCODONE BITARTRATE AND ACETAMINOPHEN 7.5; 325 MG/1; MG/1
1 TABLET ORAL EVERY 4 HOURS PRN
Qty: 42 TABLET | Refills: 0 | Status: SHIPPED | OUTPATIENT
Start: 2024-02-09

## 2024-02-09 NOTE — TELEPHONE ENCOUNTER
PATIENT CALLED AND IS REQUESTING REFILL ON PAIN MEDICATION.    [facility]

## 2024-02-12 ENCOUNTER — OFFICE VISIT (OUTPATIENT)
Dept: ORTHOPEDIC SURGERY | Facility: CLINIC | Age: 54
End: 2024-02-12
Payer: COMMERCIAL

## 2024-02-12 VITALS
BODY MASS INDEX: 39.51 KG/M2 | SYSTOLIC BLOOD PRESSURE: 123 MMHG | OXYGEN SATURATION: 93 % | WEIGHT: 282.19 LBS | HEART RATE: 68 BPM | DIASTOLIC BLOOD PRESSURE: 75 MMHG | HEIGHT: 71 IN

## 2024-02-12 DIAGNOSIS — Z47.89 AFTERCARE FOLLOWING SURGERY OF THE MUSCULOSKELETAL SYSTEM: Primary | ICD-10-CM

## 2024-02-12 DIAGNOSIS — M25.562 LEFT KNEE PAIN, UNSPECIFIED CHRONICITY: ICD-10-CM

## 2024-02-12 PROCEDURE — 99024 POSTOP FOLLOW-UP VISIT: CPT | Performed by: PHYSICIAN ASSISTANT

## 2024-02-12 RX ORDER — DICLOFENAC SODIUM 75 MG/1
75 TABLET, DELAYED RELEASE ORAL 2 TIMES DAILY
Qty: 60 TABLET | Refills: 2 | Status: SHIPPED | OUTPATIENT
Start: 2024-02-12 | End: 2024-02-19

## 2024-02-19 RX ORDER — IBUPROFEN 800 MG/1
800 TABLET ORAL 2 TIMES DAILY
Qty: 120 TABLET | Refills: 0 | Status: SHIPPED | OUTPATIENT
Start: 2024-02-19 | End: 2024-04-19

## 2024-02-26 ENCOUNTER — TELEPHONE (OUTPATIENT)
Dept: ORTHOPEDIC SURGERY | Facility: CLINIC | Age: 54
End: 2024-02-26
Payer: COMMERCIAL

## 2024-02-26 DIAGNOSIS — Z47.89 AFTERCARE FOLLOWING SURGERY OF THE MUSCULOSKELETAL SYSTEM: ICD-10-CM

## 2024-02-26 RX ORDER — HYDROCODONE BITARTRATE AND ACETAMINOPHEN 7.5; 325 MG/1; MG/1
1 TABLET ORAL EVERY 6 HOURS PRN
Qty: 28 TABLET | Refills: 0 | Status: SHIPPED | OUTPATIENT
Start: 2024-02-26

## 2024-02-26 NOTE — TELEPHONE ENCOUNTER
Called and spoke with patient and informed him that this would be his last refill and that he will need to go to his PCP for more if needed. Patient expressed understanding.

## 2024-03-07 ENCOUNTER — OFFICE VISIT (OUTPATIENT)
Dept: ORTHOPEDIC SURGERY | Facility: CLINIC | Age: 54
End: 2024-03-07
Payer: COMMERCIAL

## 2024-03-07 VITALS
HEIGHT: 71 IN | WEIGHT: 282.19 LBS | OXYGEN SATURATION: 95 % | SYSTOLIC BLOOD PRESSURE: 154 MMHG | HEART RATE: 69 BPM | DIASTOLIC BLOOD PRESSURE: 100 MMHG | BODY MASS INDEX: 39.51 KG/M2

## 2024-03-07 DIAGNOSIS — Z47.89 AFTERCARE FOLLOWING SURGERY OF THE MUSCULOSKELETAL SYSTEM: Primary | ICD-10-CM

## 2024-03-07 PROCEDURE — 99024 POSTOP FOLLOW-UP VISIT: CPT | Performed by: PHYSICIAN ASSISTANT

## 2024-03-07 NOTE — PROGRESS NOTES
"Chief Complaint  Pain and Follow-up of the Left Knee    Subjective          History of Present Illness      Basim Austin is a 54 y.o. male  presents to [facility] for     Patient presents for follow-up evaluation of left total knee arthroplasty 2023.  He is here early for evaluation he states he would like to return to work on 3/15/2024.  Patient states he has no pain he finished therapy last week he is now doing home exercises.  He only takes ibuprofen occasionally for swelling.  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      No Known Allergies     Social History     Socioeconomic History    Marital status:    Tobacco Use    Smoking status: Former     Types: Cigars     Quit date:      Years since quittin.1     Passive exposure: Never    Smokeless tobacco: Never    Tobacco comments:     Stopped Smoking 2022   Vaping Use    Vaping status: Some Days    Substances: Nicotine, Flavoring   Substance and Sexual Activity    Alcohol use: Never    Drug use: Never    Sexual activity: Defer        REVIEW OF SYSTEMS    Constitutional: Awake alert and oriented x3, no acute distress, denies fevers, chills, weight loss  Respiratory: No respiratory distress  Vascular: Brisk cap refill, Intact distal pulses, No cyanosis, compartments soft with no signs or symptoms of compartment syndrome or DVT.   Cardiovascular: Denies chest pain, shortness of breath  Skin: Denies rashes, acute skin changes  Neurologic: Denies headache, loss of consciousness  MSK: Left knee pain      Objective   Vital Signs:   /100   Pulse 69   Ht 180.3 cm (71\")   Wt 128 kg (282 lb 3 oz)   SpO2 95%   BMI 39.36 kg/m²     Body mass index is 39.36 kg/m².    Physical Exam       Left knee: Well-healed incision, no erythema, no ecchymosis " or swelling, no signs of infection, full extension flexion 115 stable to varus/valgus stress stable anterior/posterior drawer, nontender calf, negative Morales testing patient ambulates with nonantalgic gait      Procedures    Imaging Results (Most Recent)       None             Result Review :   The following data was reviewed by: ADIN Hall on 03/07/2024:               Assessment and Plan    Diagnoses and all orders for this visit:    1. Aftercare following surgery of left total knee arthroplasty 12/13/2023 (Primary)        Discussed diagnosis and treatment options with the patient his wife he was given note to return to work on 3/15/2024 with no restrictions continue activity as tolerated follow-up in 3 months, x-ray at next visit    Call or return if worsening symptoms.    Follow Up   Return in about 3 months (around 6/7/2024) for Recheck.  Patient was given instructions and counseling regarding his condition or for health maintenance advice. Please see specific information pulled into the AVS if appropriate.       EMR Dragon/Transcription disclaimer:  Much of this encounter note is an electronic transcription/translation of spoken language to printed text, aka voice recognition.  The electronic translation of spoken language may permit erroneous or at times nonsensical words or phrases to be inadvertently transcribed; although I have reviewed the note for such errors, some may still exist so please interpret based on surrounding text content.

## 2024-04-29 DIAGNOSIS — I10 PRIMARY HYPERTENSION: Chronic | ICD-10-CM

## 2024-04-29 RX ORDER — LOSARTAN POTASSIUM 100 MG/1
100 TABLET ORAL DAILY
Qty: 90 TABLET | Refills: 1 | Status: SHIPPED | OUTPATIENT
Start: 2024-04-29

## 2024-06-10 ENCOUNTER — OFFICE VISIT (OUTPATIENT)
Dept: ORTHOPEDIC SURGERY | Facility: CLINIC | Age: 54
End: 2024-06-10
Payer: COMMERCIAL

## 2024-06-10 VITALS
OXYGEN SATURATION: 94 % | DIASTOLIC BLOOD PRESSURE: 87 MMHG | WEIGHT: 282 LBS | BODY MASS INDEX: 39.48 KG/M2 | SYSTOLIC BLOOD PRESSURE: 133 MMHG | HEART RATE: 64 BPM | HEIGHT: 71 IN

## 2024-06-10 DIAGNOSIS — Z47.89 AFTERCARE FOLLOWING SURGERY OF THE MUSCULOSKELETAL SYSTEM: Primary | ICD-10-CM

## 2024-06-10 PROCEDURE — 99213 OFFICE O/P EST LOW 20 MIN: CPT | Performed by: PHYSICIAN ASSISTANT

## 2024-06-10 RX ORDER — CYCLOBENZAPRINE HCL 10 MG
10 TABLET ORAL 3 TIMES DAILY PRN
Qty: 30 TABLET | Refills: 0 | Status: SHIPPED | OUTPATIENT
Start: 2024-06-10

## 2024-06-10 NOTE — PROGRESS NOTES
Chief Complaint  Follow-up of the Left Knee    Subjective          History of Present Illness      Basim Austin is a 54 y.o. male  presents to [facility] for     Patient presents for follow-up evaluation of left total knee arthroplasty, 2023.  He was last seen on 3/7/2024 at that visit he requested to return to work on 3/15/2024.  At last visit he was happy with his progress he denied pain in the knee he finished therapy and did home exercises.  He states his job has caused him to have some increased pain to the knee he states he has to go up and down stairs at work 1 step at a time.  He states standing and walking for long periods causes him pain he states kneeling down causes pain to the knee he states it swells with activity but goes down after he rested and ices it.  He denies fever or chills, denies trauma to the knee.  He presents without brace, walker or assistive ambulatory devices.  He states he also gets a cramping to his left lower extremity in the middle of the night that causes him pain.      No Known Allergies     Social History     Socioeconomic History    Marital status:    Tobacco Use    Smoking status: Former     Types: Cigars     Quit date:      Years since quittin.4     Passive exposure: Never    Smokeless tobacco: Never    Tobacco comments:     Stopped Smoking 2022   Vaping Use    Vaping status: Former    Substances: Nicotine, Flavoring   Substance and Sexual Activity    Alcohol use: Never    Drug use: Never    Sexual activity: Defer        REVIEW OF SYSTEMS    Constitutional: Awake alert and oriented x3, no acute distress, denies fevers, chills, weight loss  Respiratory: No respiratory distress  Vascular: Brisk cap refill, Intact distal pulses, No cyanosis, compartments soft with no signs or symptoms of compartment syndrome or DVT.   Cardiovascular: Denies chest pain, shortness of breath  Skin: Denies rashes, acute skin  "changes  Neurologic: Denies headache, loss of consciousness  MSK: Left knee pain      Objective   Vital Signs:   /87   Pulse 64   Ht 180.3 cm (70.98\")   Wt 128 kg (282 lb)   SpO2 94%   BMI 39.35 kg/m²     Body mass index is 39.35 kg/m².    Physical Exam       Left knee: Well-healed incisions, no erythema, no ecchymosis, no swelling, no effusion, no fluctuance or signs of infection, full extension flexion 115, stable to varus/valgus stress stable anterior/posterior drawer, nontender calf, negative Morales testing      Procedures    Imaging Results (Most Recent)       Procedure Component Value Units Date/Time    XR Knee 3 View Left [[id number]] Resulted: 06/10/24 1653     Updated: 06/10/24 1654    Narrative:      View:AP/Lateral and Sunrise view(s)  Site: Left knee  Indication: Left knee pain  Study: X-rays ordered, taken in the office, and reviewed today  X-ray: Intact appearing left total knee arthroplasty, no signs of hardware   failure or loosening, no subsidence or periprosthetic fracture, good   alignment  Comparative data: Previous studies             Result Review :   The following data was reviewed by: ADIN Hall on 06/10/2024:  Data reviewed : Radiologic studies reviewed by me with the patient              Assessment and Plan    Diagnoses and all orders for this visit:    1. Aftercare following surgery of left total knee arthroplasty 12/13/2023 (Primary)  -     XR Knee 3 View Left  -     Sedimentation Rate; Future  -     C-reactive Protein; Future  -     CBC & Differential; Future  -     CT knee left wo contrast; Future    Other orders  -     cyclobenzaprine (FLEXERIL) 10 MG tablet; Take 1 tablet by mouth 3 (Three) Times a Day As Needed for Muscle Spasms.  Dispense: 30 tablet; Refill: 0        Reviewed x-rays with the patient, discussed diagnosis and treatment options with him, due to patient concern of pain and difficulty with certain activities at his work he was advised we will " order lab work and CT scan of the left knee to rule out infection or hardware loosening follow-up after the studies are performed at [facility] office    Call or return if worsening symptoms.    Follow Up   Return for TO REVIEW LABS, AFTER CT SCAN.  Patient was given instructions and counseling regarding his condition or for health maintenance advice. Please see specific information pulled into the AVS if appropriate.       EMR Dragon/Transcription disclaimer:  Part of this note may be an electronic transcription/translation of spoken language to printed text using the Dragon Dictation System

## 2024-07-10 ENCOUNTER — HOSPITAL ENCOUNTER (OUTPATIENT)
Dept: CT IMAGING | Facility: HOSPITAL | Age: 54
Discharge: HOME | End: 2024-07-10
Admitting: PHYSICIAN ASSISTANT
Payer: COMMERCIAL

## 2024-07-10 DIAGNOSIS — Z47.89 AFTERCARE FOLLOWING SURGERY OF THE MUSCULOSKELETAL SYSTEM: ICD-10-CM

## 2024-07-10 PROCEDURE — 73700 CT LOWER EXTREMITY W/O DYE: CPT

## 2024-08-01 ENCOUNTER — TELEPHONE (OUTPATIENT)
Dept: ORTHOPEDIC SURGERY | Facility: CLINIC | Age: 54
End: 2024-08-01
Payer: COMMERCIAL

## 2024-08-01 NOTE — TELEPHONE ENCOUNTER
LVM reminding patient to complete lab work that is ordered for him. Gave patient information on labs that he can go to, to complete labs needed. Let patient know to call our office (provided number) after he completes his labs to review MRI results and labs results. Pt will need to follow up at [facility] office. HUB may relay message.

## 2024-10-21 ENCOUNTER — TELEPHONE (OUTPATIENT)
Dept: FAMILY MEDICINE CLINIC | Facility: CLINIC | Age: 54
End: 2024-10-21
Payer: COMMERCIAL

## 2024-10-21 DIAGNOSIS — Z12.5 SCREENING PSA (PROSTATE SPECIFIC ANTIGEN): ICD-10-CM

## 2024-10-21 DIAGNOSIS — Z13.29 SCREENING FOR THYROID DISORDER: Primary | ICD-10-CM

## 2024-10-21 DIAGNOSIS — Z00.00 ANNUAL PHYSICAL EXAM: ICD-10-CM

## 2024-10-21 NOTE — TELEPHONE ENCOUNTER
Patient has CBC and C reactive in chart already. Pended additional labs    HIS PSA CAN'T BE DRAWN UNTIL 11/10/24

## 2024-10-21 NOTE — TELEPHONE ENCOUNTER
"  Caller: Basim Austin \"Basim Austin\"    Relationship: Self    Best call back number: [phone]    What orders are you requesting (i.e. lab or imaging): BLOOD WORK    In what timeframe would the patient need to come in: BEFORE NEXT VISIT 11.13.2024       "

## 2024-10-23 NOTE — TELEPHONE ENCOUNTER
Ordered labs. Please make sure he knows not to have them drawn until 11/10 so they are covered by insurance

## 2024-10-26 DIAGNOSIS — I10 PRIMARY HYPERTENSION: Chronic | ICD-10-CM

## 2024-10-28 RX ORDER — LOSARTAN POTASSIUM 100 MG/1
100 TABLET ORAL DAILY
Qty: 90 TABLET | Refills: 1 | Status: SHIPPED | OUTPATIENT
Start: 2024-10-28

## 2024-10-28 RX ORDER — AMLODIPINE BESYLATE 10 MG/1
10 TABLET ORAL DAILY
Qty: 90 TABLET | Refills: 1 | Status: SHIPPED | OUTPATIENT
Start: 2024-10-28

## 2024-11-12 ENCOUNTER — LAB (OUTPATIENT)
Dept: LAB | Facility: HOSPITAL | Age: 54
End: 2024-11-12
Payer: COMMERCIAL

## 2024-11-12 DIAGNOSIS — Z00.00 ANNUAL PHYSICAL EXAM: ICD-10-CM

## 2024-11-12 DIAGNOSIS — Z13.220 SCREENING, LIPID: ICD-10-CM

## 2024-11-12 DIAGNOSIS — Z13.29 SCREENING FOR THYROID DISORDER: ICD-10-CM

## 2024-11-12 DIAGNOSIS — Z47.89 AFTERCARE FOLLOWING SURGERY OF THE MUSCULOSKELETAL SYSTEM: ICD-10-CM

## 2024-11-12 DIAGNOSIS — Z12.5 SCREENING PSA (PROSTATE SPECIFIC ANTIGEN): ICD-10-CM

## 2024-11-12 LAB
ALBUMIN SERPL-MCNC: 4.1 G/DL (ref 3.5–5.2)
ALBUMIN/GLOB SERPL: 1.4 G/DL
ALP SERPL-CCNC: 76 U/L (ref 39–117)
ALT SERPL W P-5'-P-CCNC: 34 U/L (ref 1–41)
ANION GAP SERPL CALCULATED.3IONS-SCNC: 12.3 MMOL/L (ref 5–15)
AST SERPL-CCNC: 29 U/L (ref 1–40)
BASOPHILS # BLD AUTO: 0.05 10*3/MM3 (ref 0–0.2)
BASOPHILS NFR BLD AUTO: 0.8 % (ref 0–1.5)
BILIRUB SERPL-MCNC: 0.5 MG/DL (ref 0–1.2)
BUN SERPL-MCNC: 11 MG/DL (ref 6–20)
BUN/CREAT SERPL: 15.1 (ref 7–25)
CALCIUM SPEC-SCNC: 9.6 MG/DL (ref 8.6–10.5)
CHLORIDE SERPL-SCNC: 103 MMOL/L (ref 98–107)
CO2 SERPL-SCNC: 24.7 MMOL/L (ref 22–29)
CREAT SERPL-MCNC: 0.73 MG/DL (ref 0.76–1.27)
CRP SERPL-MCNC: 1.39 MG/DL (ref 0–0.5)
DEPRECATED RDW RBC AUTO: 40.4 FL (ref 37–54)
EGFRCR SERPLBLD CKD-EPI 2021: 106.8 ML/MIN/1.73
EOSINOPHIL # BLD AUTO: 0.2 10*3/MM3 (ref 0–0.4)
EOSINOPHIL NFR BLD AUTO: 3.1 % (ref 0.3–6.2)
ERYTHROCYTE [DISTWIDTH] IN BLOOD BY AUTOMATED COUNT: 13 % (ref 12.3–15.4)
ERYTHROCYTE [SEDIMENTATION RATE] IN BLOOD: 17 MM/HR (ref 0–20)
GLOBULIN UR ELPH-MCNC: 2.9 GM/DL
GLUCOSE SERPL-MCNC: 99 MG/DL (ref 65–99)
HCT VFR BLD AUTO: 44 % (ref 37.5–51)
HGB BLD-MCNC: 14.7 G/DL (ref 13–17.7)
IMM GRANULOCYTES # BLD AUTO: 0.02 10*3/MM3 (ref 0–0.05)
IMM GRANULOCYTES NFR BLD AUTO: 0.3 % (ref 0–0.5)
LYMPHOCYTES # BLD AUTO: 2.09 10*3/MM3 (ref 0.7–3.1)
LYMPHOCYTES NFR BLD AUTO: 32.5 % (ref 19.6–45.3)
MCH RBC QN AUTO: 28.4 PG (ref 26.6–33)
MCHC RBC AUTO-ENTMCNC: 33.4 G/DL (ref 31.5–35.7)
MCV RBC AUTO: 85.1 FL (ref 79–97)
MONOCYTES # BLD AUTO: 0.46 10*3/MM3 (ref 0.1–0.9)
MONOCYTES NFR BLD AUTO: 7.2 % (ref 5–12)
NEUTROPHILS NFR BLD AUTO: 3.61 10*3/MM3 (ref 1.7–7)
NEUTROPHILS NFR BLD AUTO: 56.1 % (ref 42.7–76)
NRBC BLD AUTO-RTO: 0 /100 WBC (ref 0–0.2)
PLATELET # BLD AUTO: 260 10*3/MM3 (ref 140–450)
PMV BLD AUTO: 10.9 FL (ref 6–12)
POTASSIUM SERPL-SCNC: 4 MMOL/L (ref 3.5–5.2)
PROT SERPL-MCNC: 7 G/DL (ref 6–8.5)
PSA SERPL-MCNC: 0.51 NG/ML (ref 0–4)
RBC # BLD AUTO: 5.17 10*6/MM3 (ref 4.14–5.8)
SODIUM SERPL-SCNC: 140 MMOL/L (ref 136–145)
T4 FREE SERPL-MCNC: 1.13 NG/DL (ref 0.92–1.68)
TSH SERPL DL<=0.05 MIU/L-ACNC: 2.53 UIU/ML (ref 0.27–4.2)
WBC NRBC COR # BLD AUTO: 6.43 10*3/MM3 (ref 3.4–10.8)

## 2024-11-12 PROCEDURE — 85652 RBC SED RATE AUTOMATED: CPT

## 2024-11-12 PROCEDURE — 80061 LIPID PANEL: CPT

## 2024-11-12 PROCEDURE — G0103 PSA SCREENING: HCPCS

## 2024-11-12 PROCEDURE — 84439 ASSAY OF FREE THYROXINE: CPT

## 2024-11-12 PROCEDURE — 80050 GENERAL HEALTH PANEL: CPT

## 2024-11-12 PROCEDURE — 86140 C-REACTIVE PROTEIN: CPT

## 2024-11-12 PROCEDURE — 36415 COLL VENOUS BLD VENIPUNCTURE: CPT

## 2024-11-13 ENCOUNTER — OFFICE VISIT (OUTPATIENT)
Dept: FAMILY MEDICINE CLINIC | Facility: CLINIC | Age: 54
End: 2024-11-13
Payer: COMMERCIAL

## 2024-11-13 VITALS
BODY MASS INDEX: 42.09 KG/M2 | SYSTOLIC BLOOD PRESSURE: 149 MMHG | RESPIRATION RATE: 16 BRPM | TEMPERATURE: 97.7 F | DIASTOLIC BLOOD PRESSURE: 89 MMHG | HEART RATE: 75 BPM | HEIGHT: 70 IN | WEIGHT: 294 LBS | OXYGEN SATURATION: 97 %

## 2024-11-13 DIAGNOSIS — E66.01 CLASS 2 SEVERE OBESITY WITH SERIOUS COMORBIDITY AND BODY MASS INDEX (BMI) OF 39.0 TO 39.9 IN ADULT, UNSPECIFIED OBESITY TYPE: ICD-10-CM

## 2024-11-13 DIAGNOSIS — E66.812 CLASS 2 SEVERE OBESITY WITH SERIOUS COMORBIDITY AND BODY MASS INDEX (BMI) OF 39.0 TO 39.9 IN ADULT, UNSPECIFIED OBESITY TYPE: ICD-10-CM

## 2024-11-13 DIAGNOSIS — Z00.00 ANNUAL PHYSICAL EXAM: Primary | ICD-10-CM

## 2024-11-13 DIAGNOSIS — Z12.11 SCREENING FOR COLON CANCER: ICD-10-CM

## 2024-11-13 DIAGNOSIS — Z13.220 SCREENING, LIPID: ICD-10-CM

## 2024-11-13 DIAGNOSIS — I10 PRIMARY HYPERTENSION: ICD-10-CM

## 2024-11-13 LAB
CHOLEST SERPL-MCNC: 177 MG/DL (ref 0–200)
HDLC SERPL-MCNC: 31 MG/DL (ref 40–60)
LDLC SERPL CALC-MCNC: 120 MG/DL (ref 0–100)
LDLC/HDLC SERPL: 3.79 {RATIO}
TRIGL SERPL-MCNC: 142 MG/DL (ref 0–150)
VLDLC SERPL-MCNC: 26 MG/DL (ref 5–40)

## 2024-11-13 NOTE — PROGRESS NOTES
Chief Complaint     Employment Physical (No concerns at this time. )    History of Present Illness     Basim Austin is a 54 y.o. male who presents to [facility] for annual physical.      He has no concerns at this time and just needs the physical done so we can fill out his employment labs. He is doing well on his hypertension medication and also doing well with his arthritis.      History      Past Medical History:   Diagnosis Date    Arthritis 2012    Hypertension 2005    Knee swelling 5/02/2023       Past Surgical History:   Procedure Laterality Date    COLONOSCOPY  2012,2015,2018    Estimate    HERNIA REPAIR  2010    Estimated 2010    TOTAL KNEE ARTHROPLASTY Left 12/13/2023    Procedure: LEFT TOTAL KNEE ARTHROPLASTY WITH LUCÍA;  Surgeon: Ousmane Edwards MD;  Location: [facility] MAIN OR;  Service: [employer] - Ortho;  Laterality: Left;       Family History   Problem Relation Age of Onset    Cancer Mother         colon    Osteoporosis Mother     Heart disease Father     Hyperlipidemia Father     Osteoporosis Maternal Grandmother     Osteoporosis Sister         Current Medications        Current Outpatient Medications:     allopurinol (ZYLOPRIM) 100 MG tablet, Take 1 tablet by mouth Daily., Disp: , Rfl:     amLODIPine (NORVASC) 10 MG tablet, TAKE 1 TABLET BY MOUTH DAILY., Disp: 90 tablet, Rfl: 1    aspirin (Ecotrin) 325 MG EC tablet, Take 1 tablet by mouth Daily. Start after Eliquis is completed., Disp: 14 tablet, Rfl: 0    losartan (COZAAR) 100 MG tablet, TAKE 1 TABLET BY MOUTH DAILY., Disp: 90 tablet, Rfl: 1    cyclobenzaprine (FLEXERIL) 10 MG tablet, Take 1 tablet by mouth 3 (Three) Times a Day As Needed for Muscle Spasms. (Patient not taking: Reported on 11/13/2024), Disp: 30 tablet, Rfl: 0     Allergies     No Known Allergies    Social History       Social History     Social History Narrative    Not on file       Immunizations     Immunization:  Immunization History  "  Administered Date(s) Administered    COVID-19 (MODERNA) 1st,2nd,3rd Dose Monovalent 04/14/2021, 05/12/2021, 01/13/2022    Influenza, Unspecified 11/01/2022, 10/16/2024    Pneumococcal Conjugate 20-Valent (PCV20) 11/01/2022    Tdap 11/01/2022          Objective     Objective     Vital Signs:   /89 (BP Location: Left arm, Patient Position: Sitting, Cuff Size: Adult)   Pulse 75   Temp 97.7 °F (36.5 °C)   Resp 16   Ht 177.8 cm (70\")   Wt 133 kg (294 lb)   SpO2 97%   BMI 42.18 kg/m²       Physical Exam  Constitutional:       Appearance: Normal appearance.   HENT:      Nose: Nose normal.      Mouth/Throat:      Mouth: Mucous membranes are moist.   Cardiovascular:      Rate and Rhythm: Normal rate and regular rhythm.      Pulses: Normal pulses.      Heart sounds: Normal heart sounds.   Pulmonary:      Effort: Pulmonary effort is normal.      Breath sounds: Normal breath sounds.   Skin:     General: Skin is warm and dry.   Neurological:      General: No focal deficit present.      Mental Status: He is alert and oriented to person, place, and time.   Psychiatric:         Mood and Affect: Mood normal.         Behavior: Behavior normal.         Results    The following data was reviewed by: THAD Singh on 11/13/2024:             Assessment and Plan        Assessment and Plan    Diagnoses and all orders for this visit:    1. Annual physical exam (Primary)    2. Screening for colon cancer  -     Cologuard - Stool, Per Rectum; Future    3. Screening, lipid  -     Lipid panel; Future    4. Class 2 severe obesity with serious comorbidity and body mass index (BMI) of 39.0 to 39.9 in adult, unspecified obesity type  Assessment & Plan:  Patient's (Body mass index is 42.18 kg/m².) indicates that they are morbidly/severely obese (BMI > 40 or > 35 with obesity - related health condition) with health conditions that include hypertension . Weight is unchanged. BMI  is above average; BMI management plan is " completed. We discussed portion control and increasing exercise.       5. Primary hypertension  Assessment & Plan:  Hypertension is stable and controlled  Continue current treatment regimen.  Blood pressure will be reassessed in 6 months.                Follow Up        Follow Up   No follow-ups on file.  Patient was given instructions and counseling regarding his condition or for health maintenance advice. Please see specific information pulled into the AVS if appropriate.

## 2024-11-21 ENCOUNTER — OFFICE VISIT (OUTPATIENT)
Dept: ORTHOPEDIC SURGERY | Facility: CLINIC | Age: 54
End: 2024-11-21
Payer: COMMERCIAL

## 2024-11-21 VITALS — OXYGEN SATURATION: 96 % | HEART RATE: 80 BPM | SYSTOLIC BLOOD PRESSURE: 138 MMHG | DIASTOLIC BLOOD PRESSURE: 88 MMHG

## 2024-11-21 DIAGNOSIS — Z47.89 AFTERCARE FOLLOWING SURGERY OF THE MUSCULOSKELETAL SYSTEM: Primary | ICD-10-CM

## 2024-11-21 RX ORDER — DICLOFENAC SODIUM 75 MG/1
75 TABLET, DELAYED RELEASE ORAL 2 TIMES DAILY
Qty: 60 TABLET | Refills: 1 | Status: SHIPPED | OUTPATIENT
Start: 2024-11-21

## 2024-11-21 NOTE — PROGRESS NOTES
Chief Complaint  Follow-up of the Left Knee     Subjective      Basim Austin presents to [facility] for a follow up for his left knee. He underwent a left knee arthroplasty with nallely performed on 23. He was last seen in the office on 06/10/24 with Felipe Longo PA-C where he had blood work and a CT scan. He presents to the office today for these results. He states his swelling in his knee has improved since his last visit.     No Known Allergies     Social History     Socioeconomic History    Marital status:    Tobacco Use    Smoking status: Former     Types: Cigars     Quit date:      Years since quittin.8     Passive exposure: Never    Smokeless tobacco: Never    Tobacco comments:     Stopped Smoking 2022   Vaping Use    Vaping status: Former    Substances: Nicotine, Flavoring   Substance and Sexual Activity    Alcohol use: Never    Drug use: Never    Sexual activity: Defer        I reviewed the patient's chief complaint, history of present illness, review of systems, past medical history, surgical history, family history, social history, medications, and allergy list.     Review of Systems     Constitutional: Denies fevers, chills, weight loss  Cardiovascular: Denies chest pain, shortness of breath  Skin: Denies rashes, acute skin changes  Neurologic: Denies headache, loss of consciousness  MSK: left knee pain       Vital Signs:   /88   Pulse 80   SpO2 96%            Ortho Exam    Physical Exam  General:Alert. No acute distress     Left lower extremity: -5 degrees extension, flexion to 125 degrees, stable to varus/valgus stress, stable to anterior/posterior drawer,  no effusion or swelling, well healed surgical incision, non tender to the lateral joint line and medial joint line , distal neurovascularly intact, calf soft, positive  pulses, positive EHL, FHL, GS, and TA. Sensation intact to all 5 nerves of the foot.     Procedures      Imaging  Results (Most Recent)       None             Result Review :       No results found.           Assessment and Plan     Diagnoses and all orders for this visit:    1. Aftercare following surgery of left total knee arthroplasty 12/13/2023 (Primary)        The patient presents here today for a follow up for his left knee. CT and blood work was discussed with the patient.     He will continue home exercises and diclofenac sent into the pharmacy today.     Call or return if worsening symptoms.    Follow Up     3 months     Will obtain X-Rays of left knee  at next visit.       Patient was given instructions and counseling regarding his condition or for health maintenance advice. Please see specific information pulled into the AVS if appropriate.     Scribed for Ousmane Edwards MD by Erin Rao.  11/21/24   15:40 EST    I have personally performed the services described in this document as scribed by the above individual and it is both accurate and complete. Ousmane Edwards MD 11/21/24

## 2024-11-21 NOTE — ASSESSMENT & PLAN NOTE
Patient's (Body mass index is 42.18 kg/m².) indicates that they are morbidly/severely obese (BMI > 40 or > 35 with obesity - related health condition) with health conditions that include hypertension . Weight is unchanged. BMI  is above average; BMI management plan is completed. We discussed portion control and increasing exercise.

## 2024-12-19 ENCOUNTER — TELEPHONE (OUTPATIENT)
Dept: ORTHOPEDIC SURGERY | Facility: CLINIC | Age: 54
End: 2024-12-19
Payer: COMMERCIAL

## 2025-02-27 ENCOUNTER — OFFICE VISIT (OUTPATIENT)
Dept: ORTHOPEDIC SURGERY | Facility: CLINIC | Age: 55
End: 2025-02-27
Payer: COMMERCIAL

## 2025-02-27 VITALS
SYSTOLIC BLOOD PRESSURE: 139 MMHG | OXYGEN SATURATION: 95 % | HEIGHT: 70 IN | WEIGHT: 294 LBS | BODY MASS INDEX: 42.09 KG/M2 | DIASTOLIC BLOOD PRESSURE: 81 MMHG | HEART RATE: 67 BPM

## 2025-02-27 DIAGNOSIS — M25.562 LEFT KNEE PAIN, UNSPECIFIED CHRONICITY: ICD-10-CM

## 2025-02-27 DIAGNOSIS — Z47.89 AFTERCARE FOLLOWING SURGERY OF THE MUSCULOSKELETAL SYSTEM: Primary | ICD-10-CM

## 2025-02-27 PROCEDURE — 99213 OFFICE O/P EST LOW 20 MIN: CPT | Performed by: PHYSICIAN ASSISTANT

## 2025-02-27 NOTE — PROGRESS NOTES
"Chief Complaint  Follow-up of the Left Knee    Subjective          History of Present Illness      Basim Austin is a 55 y.o. male  presents to [facility] for     Patient presents for follow-up evaluation of left total knee arthroplasty, 12/13/2023.  Patient states that his knee has been doing well he states if it sits too long he might get some stiffness he also states he gets some quadricep muscle soreness with activity it can get fatigued.  He denies need for pain medication or NSAIDs he states swelling has come down, no new complaints he is happy with his progress..      No Known Allergies     Social History     Socioeconomic History    Marital status:    Tobacco Use    Smoking status: Former     Types: Cigars     Quit date: 2022     Years since quitting: 3.1     Passive exposure: Never    Smokeless tobacco: Never    Tobacco comments:     Stopped Smoking 11/01/2022   Vaping Use    Vaping status: Former    Substances: Nicotine, Flavoring   Substance and Sexual Activity    Alcohol use: Never    Drug use: Never    Sexual activity: Defer        REVIEW OF SYSTEMS    Constitutional: Awake alert and oriented x3, no acute distress, denies fevers, chills, weight loss  Respiratory: No respiratory distress  Vascular: Brisk cap refill, Intact distal pulses, No cyanosis, compartments soft with no signs or symptoms of compartment syndrome or DVT.   Cardiovascular: Denies chest pain, shortness of breath  Skin: Denies rashes, acute skin changes  Neurologic: Denies headache, loss of consciousness  MSK: Left knee pain      Objective   Vital Signs:   /81   Pulse 67   Ht 177.8 cm (70\")   Wt 133 kg (294 lb)   SpO2 95%   BMI 42.18 kg/m²     Body mass index is 42.18 kg/m².    Physical Exam       Left knee: Well-healed incision, no erythema, no ecchymosis, no swelling, no effusion, no signs of infection, full extension, flexion 120, stable anterior/posterior drawer, stable to " varus/valgus stress.  Nontender to palpation, no pain with range of motion.  5 out of 5 strength, no pain with resisted range of motion      Procedures    Imaging Results (Most Recent)       Procedure Component Value Units Date/Time    XR Knee 3 View Left [[id number]] Resulted: 02/27/25 1645     Updated: 02/27/25 1645    Narrative:      View:AP/Lateral and Sunrise view(s)  Site: Left knee  Indication: Left knee pain  Study: X-rays ordered, taken in the office, and reviewed today  X-ray: Intact appearing left total knee arthroplasty, no signs of hardware   failure or loosening, no subsidence or periprosthetic fracture, good   alignment  Comparative data: Previous studies             Result Review :   The following data was reviewed by: ADIN Hall on 02/27/2025:  Data reviewed : Radiologic studies reviewed by me with the patient              Assessment and Plan    Diagnoses and all orders for this visit:    1. Aftercare following surgery of left total knee arthroplasty 12/13/2023 (Primary)    2. Left knee pain, unspecified chronicity  -     XR Knee 3 View Left        Reviewed x-rays with the patient advised him to continue activity and weightbearing as tolerated follow-up as needed per patient request    Call or return if worsening symptoms.    Follow Up   Return if symptoms worsen or fail to improve.  Patient was given instructions and counseling regarding his condition or for health maintenance advice. Please see specific information pulled into the AVS if appropriate.       EMR Dragon/Transcription disclaimer:  Part of this note may be an electronic transcription/translation of spoken language to printed text using the Dragon Dictation System

## 2025-04-28 DIAGNOSIS — I10 PRIMARY HYPERTENSION: Chronic | ICD-10-CM

## 2025-04-28 RX ORDER — LOSARTAN POTASSIUM 100 MG/1
100 TABLET ORAL DAILY
Qty: 90 TABLET | Refills: 2 | Status: SHIPPED | OUTPATIENT
Start: 2025-04-28

## 2025-04-28 RX ORDER — AMLODIPINE BESYLATE 10 MG/1
10 TABLET ORAL DAILY
Qty: 90 TABLET | Refills: 2 | Status: SHIPPED | OUTPATIENT
Start: 2025-04-28

## 2025-05-01 ENCOUNTER — OFFICE VISIT (OUTPATIENT)
Dept: FAMILY MEDICINE CLINIC | Facility: CLINIC | Age: 55
End: 2025-05-01
Payer: COMMERCIAL

## 2025-05-01 VITALS
WEIGHT: 305 LBS | HEART RATE: 66 BPM | OXYGEN SATURATION: 98 % | SYSTOLIC BLOOD PRESSURE: 145 MMHG | BODY MASS INDEX: 43.67 KG/M2 | HEIGHT: 70 IN | TEMPERATURE: 97.5 F | DIASTOLIC BLOOD PRESSURE: 110 MMHG

## 2025-05-01 DIAGNOSIS — M25.562 PAIN AND SWELLING OF LEFT KNEE: Primary | ICD-10-CM

## 2025-05-01 DIAGNOSIS — Z47.89 AFTERCARE FOLLOWING SURGERY OF THE MUSCULOSKELETAL SYSTEM: ICD-10-CM

## 2025-05-01 DIAGNOSIS — T75.3XXA MOTION SICKNESS, INITIAL ENCOUNTER: ICD-10-CM

## 2025-05-01 DIAGNOSIS — R53.83 FATIGUE, UNSPECIFIED TYPE: ICD-10-CM

## 2025-05-01 DIAGNOSIS — M25.462 PAIN AND SWELLING OF LEFT KNEE: Primary | ICD-10-CM

## 2025-05-01 DIAGNOSIS — I10 PRIMARY HYPERTENSION: ICD-10-CM

## 2025-05-01 RX ORDER — PREDNISONE 20 MG/1
20 TABLET ORAL DAILY
Qty: 7 TABLET | Refills: 0 | Status: SHIPPED | OUTPATIENT
Start: 2025-05-01 | End: 2025-05-08

## 2025-05-01 RX ORDER — SCOPOLAMINE 1 MG/3D
1 PATCH, EXTENDED RELEASE TRANSDERMAL
Qty: 10 EACH | Refills: 0 | Status: SHIPPED | OUTPATIENT
Start: 2025-05-01

## 2025-05-01 RX ORDER — DICLOFENAC SODIUM 75 MG/1
75 TABLET, DELAYED RELEASE ORAL 2 TIMES DAILY
Qty: 60 TABLET | Refills: 0 | Status: SHIPPED | OUTPATIENT
Start: 2025-05-01

## 2025-05-01 RX ORDER — METHOCARBAMOL 500 MG/1
500 TABLET, FILM COATED ORAL 4 TIMES DAILY
Qty: 56 TABLET | Refills: 0 | Status: SHIPPED | OUTPATIENT
Start: 2025-05-01 | End: 2025-05-15

## 2025-05-01 NOTE — PROGRESS NOTES
Chief Complaint     Leg Pain (And swelling, LEFT right about knee, HX of Left knee replacement )    Patient or patient representative verbalized consent for the use of Ambient Listening during the visit with  THAD Singh for chart documentation. 5/2/2025  11:08 EDT    History of Present Illness     Basim Austin is a 55 y.o. male who presents to [facility] .    History of Present Illness  The patient presents for evaluation of knee pain and fatigue.    He underwent a knee arthroplasty on 12/23/2024. Postoperatively, significant muscle discomfort is reported, characterized by swelling and pain during ambulation. He is scheduled to embark on an 8-day cruise next week and anticipates that the physical demands of the trip may exacerbate his symptoms. A prescription for an anti-inflammatory medication is sought to manage his condition during the cruise. Diclofenac was previously prescribed by Dr. Edwards, an orthopedic specialist, but no relief was noted from this medication. He is also under the care of a rheumatologist and has been taking Celebrex for arthritis, but this medication induces severe headaches, limiting its use.    Additionally, a significant decrease in energy levels compared to 5 years ago is reported, which he attributes to the aging process. He recalls being able to maintain high activity levels throughout the day 5 years prior, but now finds even moderate physical exertion, such as yard work, to be extremely taxing.    PAST SURGICAL HISTORY:  - Knee arthroplasty on 12/23/2024         History      Past Medical History:   Diagnosis Date    Arthritis 2012    Hypertension 2005    Knee swelling 5/02/2023       Past Surgical History:   Procedure Laterality Date    COLONOSCOPY  2012,2015,2018    Estimate    HERNIA REPAIR  2010    Estimated 2010    TOTAL KNEE ARTHROPLASTY Left 12/13/2023    Procedure: LEFT TOTAL KNEE ARTHROPLASTY WITH LUCÍA;  Surgeon:  "Ousmane Edwards MD;  Location: [facility] MA[facility] OR;  Service: [employer] - Ortho;  Laterality: Left;       Family History   Problem Relation Age of Onset    Cancer Mother         colon    Osteoporosis Mother     Heart disease Father     Hyperlipidemia Father     Osteoporosis Maternal Grandmother     Osteoporosis Sister         Current Medications        Current Outpatient Medications:     allopurinol (ZYLOPRIM) 100 MG tablet, Take 1 tablet by mouth Daily., Disp: , Rfl:     amLODIPine (NORVASC) 10 MG tablet, TAKE 1 TABLET BY MOUTH DAILY., Disp: 90 tablet, Rfl: 2    diclofenac (VOLTAREN) 75 MG EC tablet, Take 1 tablet by mouth 2 (Two) Times a Day., Disp: 60 tablet, Rfl: 0    losartan (COZAAR) 100 MG tablet, TAKE 1 TABLET BY MOUTH DAILY., Disp: 90 tablet, Rfl: 2    methocarbamol (ROBAXIN) 500 MG tablet, Take 1 tablet by mouth 4 (Four) Times a Day for 14 days., Disp: 56 tablet, Rfl: 0    predniSONE (DELTASONE) 20 MG tablet, Take 1 tablet by mouth Daily for 7 days., Disp: 7 tablet, Rfl: 0    Scopolamine 1 MG/3DAYS patch, Place 1 patch on the skin as directed by provider Every 72 (Seventy-Two) Hours., Disp: 10 each, Rfl: 0     Allergies     No Known Allergies    Social History       Social History     Social History Narrative    Not on file       Immunizations     Immunization:  Immunization History   Administered Date(s) Administered    COVID-19 (MODERNA) 1st,2nd,3rd Dose Monovalent 04/14/2021, 05/12/2021, 01/13/2022    Influenza, Unspecified 11/01/2022, 10/16/2024    Pneumococcal Conjugate 20-Valent (PCV20) 11/01/2022    Tdap 11/01/2022          Objective     Objective     Vital Signs:   BP (!) 145/110 (BP Location: Left arm, Patient Position: Sitting, Cuff Size: Adult)   Pulse 66   Temp 97.5 °F (36.4 °C) (Temporal)   Ht 177.8 cm (70\")   Wt (!) 138 kg (305 lb)   SpO2 98%   BMI 43.76 kg/m²       Physical Exam    Physical Exam  Musculoskeletal: Swelling and pain in muscles around the knee replacement " site.      Results    The following data was reviewed by: THAD Singh on 05/01/2025:    Common labs          11/12/2024    07:47   Common Labs   Glucose 99    BUN 11    Creatinine 0.73    Sodium 140    Potassium 4.0    Chloride 103    Calcium 9.6    Albumin 4.1    Total Bilirubin 0.5    Alkaline Phosphatase 76    AST (SGOT) 29    ALT (SGPT) 34    WBC 6.43    Hemoglobin 14.7    Hematocrit 44.0    Platelets 260    Total Cholesterol 177    Triglycerides 142    HDL Cholesterol 31    LDL Cholesterol  120    PSA 0.509          Results         Assessment and Plan        Assessment and Plan    Diagnoses and all orders for this visit:    1. Pain and swelling of left knee (Primary)  -     predniSONE (DELTASONE) 20 MG tablet; Take 1 tablet by mouth Daily for 7 days.  Dispense: 7 tablet; Refill: 0  -     methocarbamol (ROBAXIN) 500 MG tablet; Take 1 tablet by mouth 4 (Four) Times a Day for 14 days.  Dispense: 56 tablet; Refill: 0    2. Aftercare following surgery of left total knee arthroplasty 12/13/2023  -     diclofenac (VOLTAREN) 75 MG EC tablet; Take 1 tablet by mouth 2 (Two) Times a Day.  Dispense: 60 tablet; Refill: 0    3. Motion sickness, initial encounter  -     Scopolamine 1 MG/3DAYS patch; Place 1 patch on the skin as directed by provider Every 72 (Seventy-Two) Hours.  Dispense: 10 each; Refill: 0    4. Fatigue, unspecified type  -     Testosterone; Future  -     Vitamin B12; Future  -     Folate; Future  -     Vitamin D 25 hydroxy; Future  -     Iron Profile; Future    5. Primary hypertension  Comments:  Likely high due to the pain he is in  Assessment & Plan:  Hypertension is stable and controlled  Continue current treatment regimen.  Blood pressure will be reassessed in 6 months.          Assessment & Plan  1. Knee pain.  - Reports significant muscle pain and swelling in the knee, especially when walking.  - Physical exam findings indicate discomfort and limited mobility.  - Discussed the use of  prednisone, diclofenac, and Robaxin to manage pain during an upcoming cruise.  - Prescribed prednisone 7 days (once daily starting the day before departure), diclofenac 50 mg twice daily, and Robaxin. Medications sent to [facility] pharmacy.    2. Fatigue.  - Reports a significant decrease in energy levels over the past year.  - Physical exam findings and patient history suggest possible hormonal or nutritional deficiencies.  - Ordered tests for testosterone levels (to be drawn between 8:00 AM and 10:00 AM), vitamin D, B12, and iron levels.  - Awaiting test results to determine further management.    3. Motion sickness prophylaxis.  - Concerned about potential motion sickness during the upcoming cruise.  - Discussed the use of scopolamine patches for motion sickness prevention.  - Prescribed a 10-pack of scopolamine patches, each lasting 3 days.        Follow Up        Follow Up   No follow-ups on file.  Patient was given instructions and counseling regarding his condition or for health maintenance advice. Please see specific information pulled into the AVS if appropriate.    Answers submitted by the patient for this visit:  Problem not listed (Submitted on 5/1/2025)  Chief Complaint: Other medical problem  Reason for appointment: Pain in knee and low energy  joint pain: Yes  fatigue: Yes  Onset: 1 to 5 years  Chronicity: recurrent  Frequency: daily  Medications tried: Celebrex  Additional information: I had a knee replacement on 12/2023 and when I walk for any length of time the muscle in my leg hurts and swells.

## 2025-05-02 NOTE — ASSESSMENT & PLAN NOTE
Patient's (Body mass index is 43.76 kg/m².) indicates that they are morbidly/severely obese (BMI > 40 or > 35 with obesity - related health condition) with health conditions that include hypertension . Weight is unchanged. BMI  is above average; BMI management plan is completed. We discussed portion control and increasing exercise.

## 2025-05-08 ENCOUNTER — LAB (OUTPATIENT)
Dept: LAB | Facility: HOSPITAL | Age: 55
End: 2025-05-08
Payer: COMMERCIAL

## 2025-05-08 DIAGNOSIS — R53.83 FATIGUE, UNSPECIFIED TYPE: ICD-10-CM

## 2025-05-08 LAB
25(OH)D3 SERPL-MCNC: 18.2 NG/ML (ref 30–100)
FOLATE SERPL-MCNC: 6.02 NG/ML (ref 4.78–24.2)
IRON 24H UR-MRATE: 129 MCG/DL (ref 59–158)
IRON SATN MFR SERPL: 29 % (ref 20–50)
TESTOST SERPL-MCNC: 484 NG/DL (ref 193–740)
TIBC SERPL-MCNC: 441 MCG/DL (ref 298–536)
TRANSFERRIN SERPL-MCNC: 296 MG/DL (ref 200–360)
VIT B12 BLD-MCNC: 469 PG/ML (ref 211–946)

## 2025-05-08 PROCEDURE — 82306 VITAMIN D 25 HYDROXY: CPT

## 2025-05-08 PROCEDURE — 82746 ASSAY OF FOLIC ACID SERUM: CPT

## 2025-05-08 PROCEDURE — 84403 ASSAY OF TOTAL TESTOSTERONE: CPT

## 2025-05-08 PROCEDURE — 84466 ASSAY OF TRANSFERRIN: CPT

## 2025-05-08 PROCEDURE — 36415 COLL VENOUS BLD VENIPUNCTURE: CPT

## 2025-05-08 PROCEDURE — 83540 ASSAY OF IRON: CPT

## 2025-05-08 PROCEDURE — 82607 VITAMIN B-12: CPT

## 2025-05-09 DIAGNOSIS — E55.9 VITAMIN D DEFICIENCY: Primary | ICD-10-CM

## 2025-05-09 RX ORDER — ERGOCALCIFEROL 1.25 MG/1
50000 CAPSULE, LIQUID FILLED ORAL WEEKLY
Qty: 5 CAPSULE | Refills: 5 | Status: SHIPPED | OUTPATIENT
Start: 2025-05-09

## (undated) DEVICE — CVR LEG BOOTLEG F/R NOSKID 33IN

## (undated) DEVICE — GLV SURG SENSICARE PI ORTHO SZ8 LF STRL

## (undated) DEVICE — PULLOVER TOGA, 2X LARGE: Brand: FLYTE, SURGICOOL

## (undated) DEVICE — DRP ROBOTIC ROSA BX/20

## (undated) DEVICE — STRYKER PERFORMANCE SERIES SAGITTAL BLADE: Brand: STRYKER PERFORMANCE SERIES

## (undated) DEVICE — DRP SURG U/DRP INVISISHIELD PA 48X52IN

## (undated) DEVICE — Device: Brand: PULSAVAC®

## (undated) DEVICE — DRSNG PAD ABD 8X10IN STRL

## (undated) DEVICE — 3 BONE CEMENT MIXER: Brand: MIXEVAC

## (undated) DEVICE — ELECTRD BLD EDGE COAT 3IN

## (undated) DEVICE — STERILE POLYISOPRENE POWDER-FREE SURGICAL GLOVES: Brand: PROTEXIS

## (undated) DEVICE — DRSNG GZ PETROLTM XEROFORM CURAD 1X8IN STRL

## (undated) DEVICE — SHEET,DRAPE,70X85,STERILE: Brand: MEDLINE

## (undated) DEVICE — BASIC SINGLE BASIN-LF: Brand: MEDLINE INDUSTRIES, INC.

## (undated) DEVICE — DRSNG SURESITE WNDW 4X4.5

## (undated) DEVICE — TOTAL KNEE-LF: Brand: MEDLINE INDUSTRIES, INC.

## (undated) DEVICE — STERILE POLYISOPRENE POWDER-FREE SURGICAL GLOVES WITH EMOLLIENT COATING: Brand: PROTEXIS

## (undated) DEVICE — APPL CHLORAPREP HI/LITE 26ML ORNG

## (undated) DEVICE — INTENDED FOR TISSUE SEPARATION, AND OTHER PROCEDURES THAT REQUIRE A SHARP SURGICAL BLADE TO PUNCTURE OR CUT.: Brand: BARD-PARKER ® CARBON RIB-BACK BLADES

## (undated) DEVICE — DISPOSABLE TOURNIQUET CUFF 30"X4", 1-LINE, WHITE, STERILE, 1EA/PK, 10PK/CS: Brand: ASP MEDICAL

## (undated) DEVICE — GAUZE,SPONGE,4"X4",16PLY,STRL,LF,10/TRAY: Brand: MEDLINE

## (undated) DEVICE — SYR LUERLOK 30CC

## (undated) DEVICE — NO-SCRATCH ™ SMALL WHITNEY CURETTE ™ IS A SINGLE-USE, PLASTIC CURETTE FOR QUICKLY APPLYING, MANIPULATING AND REMOVING BONE CEMENT DURING HIP AND KNEE REPLACEMENT SURGERY. THE PLASTIC IS SOFTER THAN STEEL INSTRUMENTS, REDUCING THE RISK OF DAMAGING THE PROSTHESIS WITH METAL INSTRUMENTS.  THE CURETTE’S 6MM TIP REMOVES EXCESS CEMENT FROM REPLACEMENT HIPS AND KNEES. EASY-TO-MANEUVER, THE SMALL BLUE CURETTE LETS YOU REMOVE CEMENT FROM ALL EDGES OF THE PROSTHESIS.NO-SCRATCH WHITNEY SMALL CURETTE FEATURES:SAFER THAN STEEL- MADE OF PLASTIC - STURDY YET SOFTER THAN SURGICAL STEEL.HANDIER- EACH TOOL HAS A MOLDED-IN THUMB INDENTATION INSTANTLY ORIENTING THE TOOL.- EASIER TO MANEUVER IN HARD TO SEE PLACES.- COLOR-CODED FOR EASY IDENTIFICATION.FASTER- COMES INDIVIDUALLY PACKAGED IN STERILE, PEEL OPEN POUCH, READY TO GO.- APPLIES, MANIPULATES, OR REMOVES CEMENT WITH FINGERTIP PRECISION.ECONOMICAL- THE COST OF A SINGLE REVISION DWARFS THE COST OF A SINGLE-USE CURETTE. - DISPOSABLE – THERE’S NO NEED TO WASTE TIME REMOVING HARDENED CEMENT OR RE-STERILIZING TOOLS.- LESS EXPENSIVE TO BUY AND INVENTORY - ORDER ONLY THE TOOL YOU USE.- PACKAGED 25 INDIVIDUALLY WRAPPED TOOLS TO A CARTON FOR CONVENIENT SHELF STORAGE.: Brand: WHITNEY NO-SCRATCH CURETTE (SMALL)

## (undated) DEVICE — SOL IRR NACL 0.9PCT 3000ML

## (undated) DEVICE — UNDERCAST PADDING: Brand: DEROYAL

## (undated) DEVICE — MAT FLR ABS W/BLU/LINER 56X72IN WHT

## (undated) DEVICE — TOWEL,OR,DSP,ST,BLUE,STD,4/PK,20PK/CS: Brand: MEDLINE

## (undated) DEVICE — PENCL E/S SMOKEEVAC W/TELESCP CANN

## (undated) DEVICE — ANTIBACTERIAL VIOLET BRAIDED (POLYGLACTIN 910), SYNTHETIC ABSORBABLE SURGICAL SUTURE: Brand: COATED VICRYL

## (undated) DEVICE — SCRW HEX PERSONA FML 2.5X25MM PK/2
Type: IMPLANTABLE DEVICE | Site: KNEE | Status: NON-FUNCTIONAL
Removed: 2023-12-13

## (undated) DEVICE — PEEL-AWAY TOGA, 2X LARGE: Brand: FLYTE

## (undated) DEVICE — SUT ETHLN 3/0 FS1 663G

## (undated) DEVICE — PROXIMATE RH ROTATING HEAD SKIN STAPLERS (35 WIDE) CONTAINS 35 STAINLESS STEEL STAPLES: Brand: PROXIMATE

## (undated) DEVICE — SUT VIC 2/0 CT1 36IN

## (undated) DEVICE — STERILE PATIENT PROTECTIVE PAD FOR IMP® KNEE POSITIONERS & COHESIVE WRAP (10 / CASE): Brand: DE MAYO KNEE POSITIONER®

## (undated) DEVICE — NDL HYPO ECLPS SFTY 18G 1 1/2IN

## (undated) DEVICE — BNDG ELAS MATRX V/CLS 6INX10YD LF

## (undated) DEVICE — SLV SCD KN/LEN ADJ EXPRSS BLENDED MD 1P/U